# Patient Record
Sex: MALE | Race: BLACK OR AFRICAN AMERICAN | NOT HISPANIC OR LATINO | Employment: UNEMPLOYED | URBAN - METROPOLITAN AREA
[De-identification: names, ages, dates, MRNs, and addresses within clinical notes are randomized per-mention and may not be internally consistent; named-entity substitution may affect disease eponyms.]

---

## 2017-01-06 ENCOUNTER — OFFICE VISIT (OUTPATIENT)
Dept: OPHTHALMOLOGY | Facility: CLINIC | Age: 37
End: 2017-01-06

## 2017-01-06 DIAGNOSIS — S04.012A: Primary | ICD-10-CM

## 2017-01-06 DIAGNOSIS — H26.102 UNSPECIFIED TRAUMATIC CATARACT, LEFT EYE: ICD-10-CM

## 2017-01-06 DIAGNOSIS — H43.89 VITRITIS OF LEFT EYE: ICD-10-CM

## 2017-01-06 DIAGNOSIS — H54.40 VISUAL LOSS, ONE EYE, NO LIGHT PERCEPTION (NLP): ICD-10-CM

## 2017-01-06 ASSESSMENT — CONF VISUAL FIELD: OS_NORMAL: 1

## 2017-01-06 ASSESSMENT — CUP TO DISC RATIO: OD_RATIO: 0.4

## 2017-01-06 ASSESSMENT — SLIT LAMP EXAM - LIDS: COMMENTS: NORMAL

## 2017-01-06 ASSESSMENT — VISUAL ACUITY
METHOD: SNELLEN - LINEAR
OD_SC: 20/20
OD_SC+: -1
OS_SC: NLP

## 2017-01-06 ASSESSMENT — TONOMETRY
IOP_METHOD: APPLANATION
OS_IOP_MMHG: 08
OD_IOP_MMHG: 14

## 2017-01-06 ASSESSMENT — EXTERNAL EXAM - RIGHT EYE: OD_EXAM: NORMAL
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
## 2017-01-06 NOTE — MR AVS SNAPSHOT
After Visit Summary   2017    Kianna Lux    MRN: [id number]           Patient Information     Date Of Birth          1980        Visit Information        Provider Department      2017 2:20 PM Eryn Padilla MD [facility]         Follow-ups after your visit        Your next 10 appointments already scheduled     2017  2:45 PM   NEW RETINA with Nelida Barrientos MD   Eye Clinic ([facility])    [address]  [address]  [address]  [facility] [address] [phone]              Who to contact     Please call your clinic at [phone] to:    Ask questions about your health    Make or cancel appointments    Discuss your medicines    Learn about your test results    Speak to your doctor   If you have compliments or concerns about an experience at your clinic, or if you wish to file a complaint, please contact [facility] Patient Relations at [phone] or email us at [email or web address]         Additional Information About Your Visit        MyChart Information     Storific is an electronic gateway that provides easy, online access to your medical records. With Storific, you can request a clinic appointment, read your test results, renew a prescription or communicate with your care team.     To sign up for Storific visit the website at [email or web address]   You will be asked to enter the access code listed below, as well as some personal information. Please follow the directions to create your username and password.     Your access code is: [email or web address]  Expires: 2017  3:44 PM     Your access code will  in 90 days. If you need help or a new code, please contact your [facility] or call [phone] for assistance.        Care EveryWhere ID     This is your Care EveryWhere ID. This could be used by other  organizations to access your [place] medical records  [id number]         Blood Pressure from Last 3 Encounters:   No data found for BP    Weight from Last 3 Encounters:   No data found for Wt              Today, you had the following     No orders found for display       Primary Care Provider    None Specified       No primary provider on file.        Thank you!     Thank you for choosing [facility]  for your care. Our goal is always to provide you with excellent care. Hearing back from our patients is one way we can continue to improve our services. Please take a few minutes to complete the written survey that you may receive in the mail after your visit with us. Thank you!             Your Updated Medication List - Protect others around you: Learn how to safely use, store and throw away your medicines at www.disposemymeds.org.      Notice  As of 1/6/2017  3:44 PM    You have not been prescribed any medications.

## 2017-01-09 ENCOUNTER — OFFICE VISIT (OUTPATIENT)
Dept: OPHTHALMOLOGY | Facility: CLINIC | Age: 37
End: 2017-01-09
Attending: OPHTHALMOLOGY
Payer: MEDICAID

## 2017-01-09 DIAGNOSIS — S05.92XA: Primary | ICD-10-CM

## 2017-01-09 DIAGNOSIS — S04.012A: ICD-10-CM

## 2017-01-09 PROCEDURE — 99213 OFFICE O/P EST LOW 20 MIN: CPT | Mod: ZF

## 2017-01-09 RX ORDER — LATANOPROST 50 UG/ML
1 SOLUTION/ DROPS OPHTHALMIC AT BEDTIME
COMMUNITY
Start: 2016-12-15 | End: 2017-03-27

## 2017-01-09 RX ORDER — PREDNISOLONE ACETATE 10 MG/ML
1 SUSPENSION/ DROPS OPHTHALMIC 4 TIMES DAILY
Qty: 2 ML | Refills: 0 | Status: SHIPPED | OUTPATIENT
Start: 2017-01-09 | End: 2017-01-16

## 2017-01-09 ASSESSMENT — REFRACTION_WEARINGRX
OS_SPHERE: PLANO
OD_SPHERE: PLANO

## 2017-01-09 ASSESSMENT — TONOMETRY
OD_IOP_MMHG: 16
IOP_METHOD: TONOPEN
OS_IOP_MMHG: 10

## 2017-01-09 ASSESSMENT — CONF VISUAL FIELD
OS_SUPERIOR_NASAL_RESTRICTION: 1
OD_NORMAL: 1
OS_INFERIOR_NASAL_RESTRICTION: 1
OS_INFERIOR_TEMPORAL_RESTRICTION: 1
OS_SUPERIOR_TEMPORAL_RESTRICTION: 1

## 2017-01-09 ASSESSMENT — VISUAL ACUITY
OD_SC+: -1
OS_SC: LP
METHOD: SNELLEN - LINEAR
OD_SC: 20/15

## 2017-01-09 ASSESSMENT — EXTERNAL EXAM - RIGHT EYE: OD_EXAM: NORMAL

## 2017-01-09 ASSESSMENT — SLIT LAMP EXAM - LIDS: COMMENTS: NORMAL

## 2017-01-09 ASSESSMENT — CUP TO DISC RATIO: OD_RATIO: 0.4

## 2017-01-09 NOTE — NURSING NOTE
Chief Complaints and History of Present Illnesses   Patient presents with     Follow Up For     3 day follow up Optic nerve trauma of left eye     HPI    Affected eye(s):  Left   Symptoms:     No floaters   No flashes   No redness   No Dryness         Do you have eye pain now?:  Yes   Location:  OS   Pain Frequency:  Constant   Pain Characteristics:  Stabbing   Other:  pinching.      Comments:  Pt states vision has not changed since last visit. Pt feeling pinching eye pain in the back of LE today.    Mingo HANNON January 9, 2017 3:23 PM
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
## 2017-01-09 NOTE — MR AVS SNAPSHOT
After Visit Summary   2017    Kianna Lux    MRN: [id number]           Patient Information     Date Of Birth          1980        Visit Information        Provider Department      2017 2:45 PM Nelida Barrientos MD Eye Clinic        Today's Diagnoses     Ocular trauma, left eye, initial encounter    -  1        Follow-ups after your visit        Your next 10 appointments already scheduled     2017  3:30 PM   RETURN RETINA with Nelida Barrientos MD   Eye Clinic ([email or web address])    [address]  [address] [address]  9th [address] Clin [address]a  [facility] [address]   [phone]              Who to contact     Please call your clinic at [phone] to:    Ask questions about your health    Make or cancel appointments    Discuss your medicines    Learn about your test results    Speak to your doctor   If you have compliments or concerns about an experience at your clinic, or if you wish to file a complaint, please contact [facility] Patient Relations at [phone] or email us at [email or web address]         Additional Information About Your Visit        MyChart Information     sigmacaret is an electronic gateway that provides easy, online access to your medical records. With Lentigen, you can request a clinic appointment, read your test results, renew a prescription or communicate with your care team.     To sign up for Lentigen visit the website at [email or web address]   You will be asked to enter the access code listed below, as well as some personal information. Please follow the directions to create your username and password.     Your access code is: [email or web address]  Expires: 2017  3:44 PM     Your access code will  in 90 days. If you need help or a new code, please contact your [facility] or call [phone] for assistance.        Care EveryWhere ID     This is your  Care EveryWhere ID. This could be used by other organizations to access your [place] medical records  [id number]         Blood Pressure from Last 3 Encounters:   No data found for BP    Weight from Last 3 Encounters:   No data found for Wt              Today, you had the following     No orders found for display         Today's Medication Changes          These changes are accurate as of: 1/9/17  4:47 PM.  If you have any questions, ask your nurse or doctor.               Start taking these medicines.        Dose/Directions    prednisoLONE acetate 1 % ophthalmic susp   Commonly known as:  PRED FORTE   Used for:  Ocular trauma, left eye, initial encounter   Started by:  Nelida Barrientos MD        Dose:  1 drop   Place 1 drop Into the left eye 4 times daily for 7 days   Quantity:  2 mL   Refills:  0            Where to get your medicines      These medications were sent to [facility]/pharmacy #[id number] - [place], [place] - [address], [place] [address]     Phone:  [phone]    - prednisoLONE acetate 1 % ophthalmic susp             Primary Care Provider    None Specified       No primary provider on file.        Thank you!     Thank you for choosing EYE CLINIC  for your care. Our goal is always to provide you with excellent care. Hearing back from our patients is one way we can continue to improve our services. Please take a few minutes to complete the written survey that you may receive in the mail after your visit with us. Thank you!             Your Updated Medication List - Protect others around you: Learn how to safely use, store and throw away your medicines at www.disposemymeds.org.          This list is accurate as of: 1/9/17  4:47 PM.  Always use your most recent med list.                   Brand Name Dispense Instructions for use    latanoprost 0.005 % ophthalmic solution    XALATAN     Place 1 drop Into the left eye At Bedtime       prednisoLONE acetate 1 %  ophthalmic susp    PRED FORTE    2 mL    Place 1 drop Into the left eye 4 times daily for 7 days

## 2017-01-16 ENCOUNTER — OFFICE VISIT (OUTPATIENT)
Dept: OPHTHALMOLOGY | Facility: CLINIC | Age: 37
End: 2017-01-16
Attending: OPHTHALMOLOGY
Payer: MEDICAID

## 2017-01-16 DIAGNOSIS — S05.92XD OCULAR TRAUMA, LEFT EYE, SUBSEQUENT ENCOUNTER: Primary | ICD-10-CM

## 2017-01-16 PROCEDURE — 92133 CPTRZD OPH DX IMG PST SGM ON: CPT | Mod: ZF | Performed by: OPHTHALMOLOGY

## 2017-01-16 PROCEDURE — 99213 OFFICE O/P EST LOW 20 MIN: CPT | Mod: ZF

## 2017-01-16 PROCEDURE — 92134 CPTRZ OPH DX IMG PST SGM RTA: CPT | Mod: ZF | Performed by: OPHTHALMOLOGY

## 2017-01-16 ASSESSMENT — SLIT LAMP EXAM - LIDS: COMMENTS: NORMAL

## 2017-01-16 ASSESSMENT — VISUAL ACUITY
METHOD: SNELLEN - LINEAR
OD_SC: 20/15
OD_SC+: -3

## 2017-01-16 ASSESSMENT — TONOMETRY
OD_IOP_MMHG: 13
OS_IOP_MMHG: 12
IOP_METHOD: TONOPEN

## 2017-01-16 ASSESSMENT — CUP TO DISC RATIO: OD_RATIO: 0.4

## 2017-01-16 ASSESSMENT — EXTERNAL EXAM - RIGHT EYE: OD_EXAM: NORMAL
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
## 2017-01-16 NOTE — MR AVS SNAPSHOT
After Visit Summary   2017    Kianna Lux    MRN: [id number]           Patient Information     Date Of Birth          1980        Visit Information        Provider Department      2017 3:30 PM Nelida Barrientos MD Eye Clinic        Today's Diagnoses     Ocular trauma, left eye, subsequent encounter    -  1        Follow-ups after your visit        Your next 10 appointments already scheduled     2017  3:30 PM   RETURN RETINA with Nelida Barrientos MD   Eye Clinic ([facility])    [address]  [address] [facility] [address] [facility]  9th [address] Clin [address]a  [facility] [address]   [phone]              Who to contact     Please call your clinic at [phone] to:    Ask questions about your health    Make or cancel appointments    Discuss your medicines    Learn about your test results    Speak to your doctor   If you have compliments or concerns about an experience at your clinic, or if you wish to file a complaint, please contact [facility] Patient Relations at [phone] or email us at [email or web address]         Additional Information About Your Visit        MyChart Information     Keystokt is an electronic gateway that provides easy, online access to your medical records. With Pavegen Systems, you can request a clinic appointment, read your test results, renew a prescription or communicate with your care team.     To sign up for Pavegen Systems visit the website at [email or web address]   You will be asked to enter the access code listed below, as well as some personal information. Please follow the directions to create your username and password.     Your access code is: [email or web address]  Expires: 2017  3:44 PM     Your access code will  in 90 days. If you need help or a new code, please contact your [facility] or call [phone] for assistance.        Care EveryWhere ID     This is  your Care EveryWhere ID. This could be used by other organizations to access your [place] medical records  [id number]         Blood Pressure from Last 3 Encounters:   No data found for BP    Weight from Last 3 Encounters:   No data found for Wt              We Performed the Following     OCT Optic Nerve RNFL Spectralis OU (both eyes)     OCT Retina Spectralis OU (both eyes)        Primary Care Provider    None Specified       No primary provider on file.        Thank you!     Thank you for choosing EYE CLINIC  for your care. Our goal is always to provide you with excellent care. Hearing back from our patients is one way we can continue to improve our services. Please take a few minutes to complete the written survey that you may receive in the mail after your visit with us. Thank you!             Your Updated Medication List - Protect others around you: Learn how to safely use, store and throw away your medicines at www.disposemymeds.org.          This list is accurate as of: 1/16/17  5:18 PM.  Always use your most recent med list.                   Brand Name Dispense Instructions for use    latanoprost 0.005 % ophthalmic solution    XALATAN     Place 1 drop Into the left eye At Bedtime       prednisoLONE acetate 1 % ophthalmic susp    PRED FORTE    2 mL    Place 1 drop Into the left eye 4 times daily for 7 days

## 2017-01-30 ENCOUNTER — OFFICE VISIT (OUTPATIENT)
Dept: OPHTHALMOLOGY | Facility: CLINIC | Age: 37
End: 2017-01-30
Attending: OPHTHALMOLOGY
Payer: MEDICAID

## 2017-01-30 DIAGNOSIS — H47.20 OPTIC NERVE ATROPHY, LEFT: ICD-10-CM

## 2017-01-30 DIAGNOSIS — S05.92XD OCULAR TRAUMA, LEFT EYE, SUBSEQUENT ENCOUNTER: Primary | ICD-10-CM

## 2017-01-30 DIAGNOSIS — H31.322 CHOROIDAL RUPTURE, LEFT EYE: ICD-10-CM

## 2017-01-30 PROCEDURE — 92250 FUNDUS PHOTOGRAPHY W/I&R: CPT | Mod: 59 | Performed by: OPHTHALMOLOGY

## 2017-01-30 PROCEDURE — 99212 OFFICE O/P EST SF 10 MIN: CPT | Mod: 25,ZF

## 2017-01-30 PROCEDURE — 92134 CPTRZ OPH DX IMG PST SGM RTA: CPT | Mod: ZF | Performed by: OPHTHALMOLOGY

## 2017-01-30 RX ORDER — ATROPINE SULFATE 10 MG/ML
1 SOLUTION/ DROPS OPHTHALMIC DAILY
Qty: 1 BOTTLE | Refills: 1 | Status: SHIPPED | OUTPATIENT
Start: 2017-01-30 | End: 2017-03-21

## 2017-01-30 ASSESSMENT — TONOMETRY
IOP_METHOD: TONOPEN
OD_IOP_MMHG: 15
OS_IOP_MMHG: 21

## 2017-01-30 ASSESSMENT — SLIT LAMP EXAM - LIDS: COMMENTS: NORMAL

## 2017-01-30 ASSESSMENT — CUP TO DISC RATIO: OD_RATIO: 0.4

## 2017-01-30 ASSESSMENT — VISUAL ACUITY
OS_SC: NLP
METHOD: SNELLEN - LINEAR
OD_SC: 20/15

## 2017-01-30 ASSESSMENT — CONF VISUAL FIELD
OS_INFERIOR_TEMPORAL_RESTRICTION: 1
OS_INFERIOR_NASAL_RESTRICTION: 1
OS_SUPERIOR_NASAL_RESTRICTION: 1
OS_SUPERIOR_TEMPORAL_RESTRICTION: 1

## 2017-01-30 ASSESSMENT — EXTERNAL EXAM - RIGHT EYE: OD_EXAM: NORMAL

## 2017-01-30 NOTE — NURSING NOTE
Chief Complaints and History of Present Illnesses   Patient presents with     Follow Up For     2 week follow up LE     HPI    Affected eye(s):  Left   Symptoms:           Do you have eye pain now?:  Yes   Location:  OS   Pain Level:  Mild Pain (3)   Pain Duration:  2 days   Pain Frequency:  Intermittent   Pain Characteristics:  Stabbing      Comments:  Latanoprost took last PM for first time  Pred forte qid KAREN  No change in vision since last visit  Tiffany RG 3:38 PM January 30, 2017

## 2017-01-30 NOTE — MR AVS SNAPSHOT
After Visit Summary   2017    Kianna Lux    MRN: [id number]           Patient Information     Date Of Birth          1980        Visit Information        Provider Department      2017 3:30 PM Nelida Barrientos MD Eye Clinic        Today's Diagnoses     Ocular trauma, left eye, subsequent encounter    -  1     Optic nerve atrophy, left         Choroidal rupture, left eye            Follow-ups after your visit        Your next 10 appointments already scheduled     Mar 06, 2017  3:30 PM   RETURN RETINA with Nelida Barrientos MD   Eye Clinic ([facility])    [address]  [address]  9th [address] Clin [address]a  [facility] [address]   [phone]              Who to contact     Please call your clinic at [phone] to:    Ask questions about your health    Make or cancel appointments    Discuss your medicines    Learn about your test results    Speak to your doctor   If you have compliments or concerns about an experience at your clinic, or if you wish to file a complaint, please contact [facility] Patient Relations at [phone] or email us at [email or web address]         Additional Information About Your Visit        MyChart Information     Elastix Corporationt is an electronic gateway that provides easy, online access to your medical records. With Plugaround, you can request a clinic appointment, read your test results, renew a prescription or communicate with your care team.     To sign up for Elastix Corporationt visit the website at [email or web address]   You will be asked to enter the access code listed below, as well as some personal information. Please follow the directions to create your username and password.     Your access code is: [email or web address]  Expires: 2017  3:44 PM     Your access code will  in 90 days. If you need help or a new code, please contact your [facility] or call  [phone] for assistance.        Care EveryWhere ID     This is your Care EveryWhere ID. This could be used by other organizations to access your [place] medical records  [id number]         Blood Pressure from Last 3 Encounters:   No data found for BP    Weight from Last 3 Encounters:   No data found for Wt              We Performed the Following     Fundus Photos OU (both eyes)     OCT Retina Spectralis OU (both eyes)        Primary Care Provider    None Specified       No primary provider on file.        Thank you!     Thank you for choosing EYE CLINIC  for your care. Our goal is always to provide you with excellent care. Hearing back from our patients is one way we can continue to improve our services. Please take a few minutes to complete the written survey that you may receive in the mail after your visit with us. Thank you!             Your Updated Medication List - Protect others around you: Learn how to safely use, store and throw away your medicines at www.disposemymeds.org.          This list is accurate as of: 1/30/17  5:07 PM.  Always use your most recent med list.                   Brand Name Dispense Instructions for use    latanoprost 0.005 % ophthalmic solution    XALATAN     Place 1 drop Into the left eye At Bedtime
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
## 2017-03-15 ENCOUNTER — TELEPHONE (OUTPATIENT)
Dept: OPHTHALMOLOGY | Facility: CLINIC | Age: 37
End: 2017-03-15

## 2017-03-15 NOTE — TELEPHONE ENCOUNTER
Reviewed with pharmacy prednisolone eye drop.  Therapy compleate after starting taper 1-30-17  No refill necessary  Bharathi Alberto RN 9:10 AM 03/15/17

## 2017-03-15 NOTE — TELEPHONE ENCOUNTER
Pharmacy calling  Atropine on hold at pharmacy  Would like to know if should fill per pt request  Was unable to locate if should continue per last note  Note given to retina resident to verify if should continue  Bharathi Alberto RN 4:29 PM 03/15/17

## 2017-03-15 NOTE — TELEPHONE ENCOUNTER
Dr. Lance reviewed  Ok to dispense atropine with no refills until reassessed end of month-- atropine currently hold  Bharathi Alberto RN 4:52 PM 03/15/17    Pharmacy aware

## 2017-03-17 DIAGNOSIS — S05.92XD OCULAR TRAUMA, LEFT EYE, SUBSEQUENT ENCOUNTER: ICD-10-CM
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
## 2017-03-17 NOTE — TELEPHONE ENCOUNTER
----- Message from Eryn Christy sent at 3/17/2017  1:38 PM CDT -----  Regarding: Pt Needs Rx ASAP - Pt is out of drops  Contact: [phone]  Pt called today requesting a refill of : atropine 1 % ophthalmic solution.    Has the Pt called the Pharmacy to request? : Yes. They do not have any refills on file. (called old Pharmacy).    The specific requested Pharmacy? : NEW Pharmacy!!! [facility], [address] [place] [phone], Ph # : [phone].    How soon is the refill needed? : ASAP - Pt is out.    Is there any special information the nurse/provider would need to be sure that this is taken care of correctly and quickly? : Pt in the [facility].    Is there a particular number that should be used if there are questions when refilling this medication? : [phone].      Thanks,  Eryn in Call Center    Please DO NOT send this message and/or reply back to sender.  Call Center Representatives DO NOT respond to messages.

## 2017-03-20 RX ORDER — ATROPINE SULFATE 10 MG/ML
1 SOLUTION/ DROPS OPHTHALMIC DAILY
Qty: 1 BOTTLE | Refills: 1 | OUTPATIENT
Start: 2017-03-20

## 2017-03-21 DIAGNOSIS — S05.92XD OCULAR TRAUMA, LEFT EYE, SUBSEQUENT ENCOUNTER: ICD-10-CM

## 2017-03-21 RX ORDER — ATROPINE SULFATE 10 MG/ML
1 SOLUTION/ DROPS OPHTHALMIC DAILY
Qty: 1 BOTTLE | Refills: 0 | Status: SHIPPED | OUTPATIENT
Start: 2017-03-21 | End: 2017-03-27

## 2017-03-21 NOTE — TELEPHONE ENCOUNTER
Ok per Dr. Lance (retina resident) to dispense one bottle until next visit  Bharathi Alberto RN 10:25 AM 03/21/17

## 2017-03-27 ENCOUNTER — OFFICE VISIT (OUTPATIENT)
Dept: OPHTHALMOLOGY | Facility: CLINIC | Age: 37
End: 2017-03-27
Attending: OPHTHALMOLOGY
Payer: COMMERCIAL

## 2017-03-27 DIAGNOSIS — H46.8 TRAUMATIC OPTIC NEUROPATHY: Primary | ICD-10-CM

## 2017-03-27 DIAGNOSIS — H31.322 CHOROIDAL RUPTURE, LEFT: ICD-10-CM

## 2017-03-27 PROCEDURE — 99213 OFFICE O/P EST LOW 20 MIN: CPT | Mod: ZF

## 2017-03-27 ASSESSMENT — TONOMETRY
OD_IOP_MMHG: 13
IOP_METHOD: TONOPEN
OS_IOP_MMHG: 13

## 2017-03-27 ASSESSMENT — CUP TO DISC RATIO: OD_RATIO: 0.4

## 2017-03-27 ASSESSMENT — SLIT LAMP EXAM - LIDS: COMMENTS: NORMAL

## 2017-03-27 ASSESSMENT — VISUAL ACUITY
OD_SC: 20/15
OS_SC: NLP
METHOD: SNELLEN - LINEAR

## 2017-03-27 ASSESSMENT — EXTERNAL EXAM - RIGHT EYE: OD_EXAM: NORMAL

## 2017-03-27 NOTE — MR AVS SNAPSHOT
After Visit Summary   3/27/2017    Kianna Lux    MRN: [id number]           Patient Information     Date Of Birth          1980        Visit Information        Provider Department      3/27/2017 3:00 PM [facility]        Today's Diagnoses     Traumatic optic neuropathy    -  1    Choroidal rupture, left           Follow-ups after your visit        Follow-up notes from your care team     Return in about 3 months (around 2017) for left eye trauma.      Who to contact     Please call your clinic at [phone] to:    Ask questions about your health    Make or cancel appointments    Discuss your medicines    Learn about your test results    Speak to your doctor   If you have compliments or concerns about an experience at your clinic, or if you wish to file a complaint, please contact [facility] Patient Relations at [phone] or email us at [email or web address]         Additional Information About Your Visit        MyChart Information     [employer] is an electronic gateway that provides easy, online access to your medical records. With EBOOKAPLACE, you can request a clinic appointment, read your test results, renew a prescription or communicate with your care team.     To sign up for [employer] visit the website at [email or web address]   You will be asked to enter the access code listed below, as well as some personal information. Please follow the directions to create your username and password.     Your access code is: [email or web address]  Expires: 2017  4:44 PM     Your access code will  in 90 days. If you need help or a new code, please contact your [facility] or call [phone] for assistance.        Care EveryWhere ID     This is your Care EveryWhere ID. This could be used by other organizations to access your [facility] medical records  [id number]         Blood Pressure from Last  3 Encounters:   No data found for BP    Weight from Last 3 Encounters:   No data found for Wt                 Today's Medication Changes          These changes are accurate as of: 3/27/17 11:59 PM.  If you have any questions, ask your nurse or doctor.               Stop taking these medicines if you haven't already. Please contact your care team if you have questions.     atropine 1 % ophthalmic solution   Stopped by:  Nelida Barrientos MD           latanoprost 0.005 % ophthalmic solution   Commonly known as:  XALATAN   Stopped by:  Nelida Barrientos MD                    Primary Care Provider    None Specified       No primary provider on file.        Thank you!     Thank you for choosing [facility]IC  for your care. Our goal is always to provide you with excellent care. Hearing back from our patients is one way we can continue to improve our services. Please take a few minutes to complete the written survey that you may receive in the mail after your visit with us. Thank you!             Your Updated Medication List - Protect others around you: Learn how to safely use, store and throw away your medicines at www.disposemymeds.org.      Notice  As of 3/27/2017 11:59 PM    You have not been prescribed any medications.
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
## 2017-08-29 ENCOUNTER — OFFICE VISIT (OUTPATIENT)
Dept: OPHTHALMOLOGY | Facility: CLINIC | Age: 37
End: 2017-08-29

## 2017-08-29 DIAGNOSIS — H57.12 PAIN IN OR AROUND EYE, LEFT: Primary | ICD-10-CM

## 2017-08-29 DIAGNOSIS — H47.20 OPTIC NERVE ATROPHY, LEFT: ICD-10-CM

## 2017-08-29 DIAGNOSIS — S05.92XD OCULAR TRAUMA, LEFT EYE, SUBSEQUENT ENCOUNTER: ICD-10-CM

## 2017-08-29 DIAGNOSIS — H46.8 TRAUMATIC OPTIC NEUROPATHY: ICD-10-CM

## 2017-08-29 RX ORDER — ATROPINE SULFATE 10 MG/ML
1 SOLUTION/ DROPS OPHTHALMIC DAILY
Qty: 1 BOTTLE | Refills: 0 | Status: SHIPPED | OUTPATIENT
Start: 2017-08-29 | End: 2017-11-22

## 2017-08-29 RX ORDER — ACETAMINOPHEN 325 MG/1
650 TABLET ORAL EVERY 4 HOURS PRN
COMMUNITY
Start: 2016-11-28 | End: 2018-06-23

## 2017-08-29 ASSESSMENT — REFRACTION_WEARINGRX
OD_SPHERE: PLANO
OS_SPHERE: PLANO

## 2017-08-29 ASSESSMENT — CONF VISUAL FIELD
OS_INFERIOR_TEMPORAL_RESTRICTION: 1
OS_SUPERIOR_NASAL_RESTRICTION: 1
METHOD: COUNTING FINGERS
OS_SUPERIOR_TEMPORAL_RESTRICTION: 1
OS_INFERIOR_NASAL_RESTRICTION: 1

## 2017-08-29 ASSESSMENT — CUP TO DISC RATIO: OD_RATIO: 0.4

## 2017-08-29 ASSESSMENT — VISUAL ACUITY
METHOD: SNELLEN - LINEAR
OS_SC: NLP
OD_SC: 20/15

## 2017-08-29 ASSESSMENT — EXTERNAL EXAM - RIGHT EYE: OD_EXAM: NORMAL

## 2017-08-29 ASSESSMENT — TONOMETRY
IOP_METHOD: ICARE
OS_IOP_MMHG: 20
OD_IOP_MMHG: 21

## 2017-08-29 ASSESSMENT — SLIT LAMP EXAM - LIDS: COMMENTS: NORMAL

## 2017-08-29 NOTE — MR AVS SNAPSHOT
After Visit Summary   2017    Kianna Lux    MRN: [id number]           Patient Information     Date Of Birth          1980        Visit Information        Provider Department      2017 1:20 PM Eryn Padilla MD [facility]        Today's Diagnoses     Pain in or around eye, left    -  1       Follow-ups after your visit        Who to contact     Please call your clinic at [phone] to:    Ask questions about your health    Make or cancel appointments    Discuss your medicines    Learn about your test results    Speak to your doctor   If you have compliments or concerns about an experience at your clinic, or if you wish to file a complaint, please contact [facility] Patient Relations at [phone] or email us at [email or web address]         Additional Information About Your Visit        MyChart Information     PlanetHSt is an electronic gateway that provides easy, online access to your medical records. With Yo que Vos, you can request a clinic appointment, read your test results, renew a prescription or communicate with your care team.     To sign up for PlanetHSt visit the website at [email or web address]   You will be asked to enter the access code listed below, as well as some personal information. Please follow the directions to create your username and password.     Your access code is: [email or web address]  Expires: 2017  6:30 AM     Your access code will  in 90 days. If you need help or a new code, please contact your [facility] or call [phone] for assistance.        Care EveryWhere ID     This is your Care EveryWhere ID. This could be used by other organizations to access your [facility] medical records  [id number]         Blood Pressure from Last 3 Encounters:   No data found for BP    Weight from Last 3 Encounters:   No data found for Wt               Today, you had the following     No orders found for display         Today's Medication Changes          These changes are accurate as of: 8/29/17  2:09 PM.  If you have any questions, ask your nurse or doctor.               Start taking these medicines.        Dose/Directions    atropine 1 % ophthalmic solution   Used for:  Pain in or around eye, left   Started by:  Eryn Padilla MD        Dose:  1 drop   Place 1 drop Into the left eye daily   Quantity:  1 Bottle   Refills:  0            Where to get your medicines      These medications were sent to [facility]/pharmacy #[id number] - [place], [place] - [address] AT [facility]  [address] [place] [address]    Hours:  24-hours Phone:  [phone]     atropine 1 % ophthalmic solution                Primary Care Provider    None Specified       No primary provider on file.        Equal Access to Services     PADILLA CHAUDHARI : Tao carroll Solazara, waaxda luqadaha, qaybta kaalmada toryyaaiyana, sandra lentz . So [facility] [phone].    ATENCIÓN: Si habla español, tiene a robins disposición servicios gratuitos de asistencia lingüística. Llame al [phone].    We comply with applicable federal civil rights laws and [place] laws. We do not discriminate on the basis of race, color, national origin, age, disability sex, sexual orientation or gender identity.            Thank you!     Thank you for choosing [facility]  for your care. Our goal is always to provide you with excellent care. Hearing back from our patients is one way we can continue to improve our services. Please take a few minutes to complete the written survey that you may receive in the mail after your visit with us. Thank you!             Your Updated Medication List - Protect others around you: Learn how to safely use, store and throw away your medicines at www.disposemymeds.org.          This list is accurate as  of: 8/29/17  2:09 PM.  Always use your most recent med list.                   Brand Name Dispense Instructions for use Diagnosis    acetaminophen 325 MG tablet    TYLENOL     Take 650 mg by mouth every 4 hours as needed        atropine 1 % ophthalmic solution     1 Bottle    Place 1 drop Into the left eye daily    Pain in or around eye, left

## 2017-09-04 ASSESSMENT — EXTERNAL EXAM - LEFT EYE: OS_EXAM: NORMAL

## 2017-09-04 ASSESSMENT — CONF VISUAL FIELD: OD_NORMAL: 1
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
## 2017-10-20 ENCOUNTER — TELEPHONE (OUTPATIENT)
Dept: OPHTHALMOLOGY | Facility: CLINIC | Age: 37
End: 2017-10-20

## 2017-10-20 NOTE — TELEPHONE ENCOUNTER
----- Message from Eryn Padilla MD sent at 10/20/2017 12:02 PM CDT -----  Can you print the following information and fax to [facility]?  Patient requested a referral.    [phone] is the fax for   [facility]        Fwd:  Thank you for your referral.       The Department of Ophthalmology is requesting medical information for review.    Please fax or mail the following medical records to [phone] or to the address below:              A letter summarizing the patient's medical condition, current symptoms and reason for referral              Copies of medical record information, including- Pertinent clinical and surgical documentation              Current list of all medication (prescription and over-the-counter medication, herbal and dietary supplements, and vitamins)              A complete record of immunizations to date              Pathology reports              Recent testing and results (X-rays, CT, MRI reports)              Prior operative reports       Actual imaging/clinical information can be mailed to the following address  (if unable to push through to our system or already available):       Department of Ophthalmology  [address] [address]    Include the patient's full name, date of birth and [facility] number on all correspondence.    If you have questions, please call us at [phone] or [phone]. You may also reply to this message.          Sincerely,  [facility] for Referring Physicians Appointment Office

## 2017-11-22 DIAGNOSIS — H57.12 PAIN IN OR AROUND EYE, LEFT: Primary | ICD-10-CM

## 2017-11-22 RX ORDER — ATROPINE SULFATE 10 MG/ML
1 SOLUTION/ DROPS OPHTHALMIC DAILY
Qty: 15 ML | Refills: 0 | Status: SHIPPED | OUTPATIENT
Start: 2017-11-22 | End: 2018-06-23

## 2017-12-04 DIAGNOSIS — Z53.9 ERRONEOUS ENCOUNTER--DISREGARD: ICD-10-CM

## 2017-12-04 DIAGNOSIS — S05.90XA OCULAR TRAUMA: Primary | ICD-10-CM

## 2017-12-05 NOTE — TELEPHONE ENCOUNTER
"----- Message from Chin Castrejon sent at 12/4/2017  2:26 PM CST -----  Regarding: Refresh optic gel drops refill  Contact: [phone]  Patient calls today requesting a refill of: Refresh optic gel drops.    Has the patient called the pharmacy to request? Refill is not on chart, needs auth. (If not suggest that they can do that in the future and it may speed up the process)    The specific requested pharmacy (name and address) is [facility] [address] [place] [address] . (Cannot be \"it is in the chart\" -- name and location needed.)    How soon is the refill needed? Within a few days, nothing urgent.    Is there any special information the nurse/provider would needed to be sure that this is taken care of correctly and quickly? Didn't see the prescription on his chart, even checked non-current prescr. Claimed he took it awhile back, now would like a refill as the Atropine he is currently taking doesn't work as well.    Is there a particular number that should be used if there are questions when refilling this medication? Pt's number: [phone], pharmacy phone #: [phone]. Fax is listed as same number as phone.    Thank you,    Chin Castrejon  Call Center    Please DO NOT send this message and/or reply back to sender. Call Center Representatives DO NOT respond to messages.  "
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
## 2017-12-13 DIAGNOSIS — H04.123 DRY EYES, BILATERAL: Primary | ICD-10-CM
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
## 2018-06-23 ENCOUNTER — HOSPITAL ENCOUNTER (EMERGENCY)
Facility: CLINIC | Age: 38
Discharge: HOME | End: 2018-06-23
Attending: EMERGENCY MEDICINE | Admitting: EMERGENCY MEDICINE
Payer: COMMERCIAL

## 2018-06-23 ENCOUNTER — APPOINTMENT (OUTPATIENT)
Dept: GENERAL RADIOLOGY | Facility: CLINIC | Age: 38
End: 2018-06-23
Attending: EMERGENCY MEDICINE
Payer: COMMERCIAL

## 2018-06-23 ENCOUNTER — APPOINTMENT (OUTPATIENT)
Dept: CT IMAGING | Facility: CLINIC | Age: 38
End: 2018-06-23
Attending: EMERGENCY MEDICINE
Payer: COMMERCIAL

## 2018-06-23 VITALS
BODY MASS INDEX: 22.4 KG/M2 | HEIGHT: 71 IN | HEART RATE: 84 BPM | WEIGHT: 160 LBS | RESPIRATION RATE: 16 BRPM | SYSTOLIC BLOOD PRESSURE: 154 MMHG | OXYGEN SATURATION: 98 % | TEMPERATURE: 98.2 F | DIASTOLIC BLOOD PRESSURE: 100 MMHG

## 2018-06-23 DIAGNOSIS — N20.1 URETERAL STONE: ICD-10-CM

## 2018-06-23 DIAGNOSIS — N23 RENAL COLIC: ICD-10-CM

## 2018-06-23 LAB
ALBUMIN SERPL-MCNC: 4 G/DL (ref 3.4–5)
ALBUMIN UR-MCNC: NEGATIVE MG/DL
ALP SERPL-CCNC: 80 U/L (ref 40–150)
ALT SERPL W P-5'-P-CCNC: 21 U/L (ref 0–70)
AMORPH CRY #/AREA URNS HPF: ABNORMAL /HPF
AMPHETAMINES UR QL SCN: NEGATIVE
ANION GAP SERPL CALCULATED.3IONS-SCNC: 9 MMOL/L (ref 3–14)
APPEARANCE UR: ABNORMAL
AST SERPL W P-5'-P-CCNC: 20 U/L (ref 0–45)
BARBITURATES UR QL: NEGATIVE
BASOPHILS # BLD AUTO: 0 10E9/L (ref 0–0.2)
BASOPHILS NFR BLD AUTO: 0.1 %
BENZODIAZ UR QL: NEGATIVE
BILIRUB SERPL-MCNC: 0.5 MG/DL (ref 0.2–1.3)
BILIRUB UR QL STRIP: NEGATIVE
BUN SERPL-MCNC: 13 MG/DL (ref 7–30)
CALCIUM SERPL-MCNC: 8.9 MG/DL (ref 8.5–10.1)
CANNABINOIDS UR QL SCN: NEGATIVE
CHLORIDE SERPL-SCNC: 105 MMOL/L (ref 94–109)
CO2 SERPL-SCNC: 27 MMOL/L (ref 20–32)
COCAINE UR QL: NEGATIVE
COLOR UR AUTO: ABNORMAL
CREAT SERPL-MCNC: 0.96 MG/DL (ref 0.66–1.25)
DIFFERENTIAL METHOD BLD: ABNORMAL
EOSINOPHIL # BLD AUTO: 0 10E9/L (ref 0–0.7)
EOSINOPHIL NFR BLD AUTO: 0.2 %
ERYTHROCYTE [DISTWIDTH] IN BLOOD BY AUTOMATED COUNT: 12.6 % (ref 10–15)
ETHANOL UR QL SCN: NEGATIVE
GFR SERPL CREATININE-BSD FRML MDRD: 89 ML/MIN/1.7M2
GLUCOSE SERPL-MCNC: 137 MG/DL (ref 70–99)
GLUCOSE UR STRIP-MCNC: NEGATIVE MG/DL
HCT VFR BLD AUTO: 44.5 % (ref 40–53)
HGB BLD-MCNC: 15.2 G/DL (ref 13.3–17.7)
HGB UR QL STRIP: ABNORMAL
IMM GRANULOCYTES # BLD: 0.1 10E9/L (ref 0–0.4)
IMM GRANULOCYTES NFR BLD: 0.4 %
KETONES UR STRIP-MCNC: NEGATIVE MG/DL
LACTATE BLD-SCNC: 1.2 MMOL/L (ref 0.7–2)
LEUKOCYTE ESTERASE UR QL STRIP: NEGATIVE
LIPASE SERPL-CCNC: 158 U/L (ref 73–393)
LYMPHOCYTES # BLD AUTO: 0.8 10E9/L (ref 0.8–5.3)
LYMPHOCYTES NFR BLD AUTO: 7.4 %
MCH RBC QN AUTO: 27.9 PG (ref 26.5–33)
MCHC RBC AUTO-ENTMCNC: 34.2 G/DL (ref 31.5–36.5)
MCV RBC AUTO: 82 FL (ref 78–100)
MONOCYTES # BLD AUTO: 0.6 10E9/L (ref 0–1.3)
MONOCYTES NFR BLD AUTO: 5 %
NEUTROPHILS # BLD AUTO: 9.7 10E9/L (ref 1.6–8.3)
NEUTROPHILS NFR BLD AUTO: 86.9 %
NITRATE UR QL: NEGATIVE
NRBC # BLD AUTO: 0 10*3/UL
NRBC BLD AUTO-RTO: 0 /100
OPIATES UR QL SCN: NEGATIVE
PH UR STRIP: 7.5 PH (ref 5–7)
PLATELET # BLD AUTO: 282 10E9/L (ref 150–450)
POTASSIUM SERPL-SCNC: 3.5 MMOL/L (ref 3.4–5.3)
PROT SERPL-MCNC: 8.1 G/DL (ref 6.8–8.8)
RBC # BLD AUTO: 5.45 10E12/L (ref 4.4–5.9)
RBC #/AREA URNS AUTO: 90 /HPF (ref 0–2)
SODIUM SERPL-SCNC: 141 MMOL/L (ref 133–144)
SOURCE: ABNORMAL
SP GR UR STRIP: 1.01 (ref 1–1.03)
UROBILINOGEN UR STRIP-MCNC: NORMAL MG/DL (ref 0–2)
WBC # BLD AUTO: 11.2 10E9/L (ref 4–11)
WBC #/AREA URNS AUTO: 0 /HPF (ref 0–5)

## 2018-06-23 PROCEDURE — 96375 TX/PRO/DX INJ NEW DRUG ADDON: CPT | Performed by: EMERGENCY MEDICINE

## 2018-06-23 PROCEDURE — 99284 EMERGENCY DEPT VISIT MOD MDM: CPT | Mod: Z6 | Performed by: EMERGENCY MEDICINE

## 2018-06-23 PROCEDURE — 74176 CT ABD & PELVIS W/O CONTRAST: CPT

## 2018-06-23 PROCEDURE — 96361 HYDRATE IV INFUSION ADD-ON: CPT | Performed by: EMERGENCY MEDICINE

## 2018-06-23 PROCEDURE — 85025 COMPLETE CBC W/AUTO DIFF WBC: CPT | Performed by: EMERGENCY MEDICINE

## 2018-06-23 PROCEDURE — 83605 ASSAY OF LACTIC ACID: CPT | Performed by: EMERGENCY MEDICINE

## 2018-06-23 PROCEDURE — 80307 DRUG TEST PRSMV CHEM ANLYZR: CPT | Performed by: EMERGENCY MEDICINE

## 2018-06-23 PROCEDURE — 83690 ASSAY OF LIPASE: CPT | Performed by: EMERGENCY MEDICINE

## 2018-06-23 PROCEDURE — 81001 URINALYSIS AUTO W/SCOPE: CPT | Performed by: EMERGENCY MEDICINE

## 2018-06-23 PROCEDURE — 80320 DRUG SCREEN QUANTALCOHOLS: CPT | Performed by: EMERGENCY MEDICINE

## 2018-06-23 PROCEDURE — 96374 THER/PROPH/DIAG INJ IV PUSH: CPT | Performed by: EMERGENCY MEDICINE

## 2018-06-23 PROCEDURE — 80053 COMPREHEN METABOLIC PANEL: CPT | Performed by: EMERGENCY MEDICINE

## 2018-06-23 PROCEDURE — 99285 EMERGENCY DEPT VISIT HI MDM: CPT | Mod: 25 | Performed by: EMERGENCY MEDICINE

## 2018-06-23 PROCEDURE — 25000128 H RX IP 250 OP 636: Performed by: EMERGENCY MEDICINE

## 2018-06-23 PROCEDURE — 74019 RADEX ABDOMEN 2 VIEWS: CPT

## 2018-06-23 RX ORDER — IBUPROFEN 600 MG/1
600 TABLET, FILM COATED ORAL EVERY 6 HOURS PRN
Qty: 20 TABLET | Refills: 0 | Status: SHIPPED | OUTPATIENT
Start: 2018-06-23

## 2018-06-23 RX ORDER — TAMSULOSIN HYDROCHLORIDE 0.4 MG/1
0.4 CAPSULE ORAL DAILY
Qty: 10 CAPSULE | Refills: 0 | Status: SHIPPED | OUTPATIENT
Start: 2018-06-23 | End: 2018-07-03

## 2018-06-23 RX ORDER — HYDROCODONE BITARTRATE AND ACETAMINOPHEN 5; 325 MG/1; MG/1
1 TABLET ORAL EVERY 6 HOURS PRN
Qty: 10 TABLET | Refills: 0 | Status: ON HOLD | OUTPATIENT
Start: 2018-06-23 | End: 2019-12-13

## 2018-06-23 RX ORDER — KETOROLAC TROMETHAMINE 30 MG/ML
30 INJECTION, SOLUTION INTRAMUSCULAR; INTRAVENOUS ONCE
Status: COMPLETED | OUTPATIENT
Start: 2018-06-23 | End: 2018-06-23

## 2018-06-23 RX ADMIN — PROCHLORPERAZINE EDISYLATE 10 MG: 5 INJECTION INTRAMUSCULAR; INTRAVENOUS at 07:34

## 2018-06-23 RX ADMIN — KETOROLAC TROMETHAMINE 30 MG: 30 INJECTION, SOLUTION INTRAMUSCULAR at 07:29

## 2018-06-23 RX ADMIN — SODIUM CHLORIDE 1000 ML: 9 INJECTION, SOLUTION INTRAVENOUS at 07:28

## 2018-06-23 ASSESSMENT — ENCOUNTER SYMPTOMS
CONSTIPATION: 0
SORE THROAT: 0
FEVER: 0
VOMITING: 1
HEADACHES: 0
ANOREXIA: 0
FLATUS: 0
COUGH: 0
NECK STIFFNESS: 0
CHILLS: 0
HEMATOCHEZIA: 0
DYSURIA: 0
CONFUSION: 0
BELCHING: 0
ARTHRALGIAS: 0
HEMATEMESIS: 0
NAUSEA: 1
DIFFICULTY URINATING: 0
COLOR CHANGE: 0
SHORTNESS OF BREATH: 0
EYE REDNESS: 0
FATIGUE: 0
ABDOMINAL PAIN: 1
DIARRHEA: 0
HEMATURIA: 0
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
## 2018-06-23 NOTE — ED NOTES
Pt reported left quadrant pain started after eating a full meal at around 10pm. He reported that patient threw up 5 times of previously ingested food. Denies fever and diarrhea

## 2018-06-23 NOTE — ED NOTES
Pt declined discharge vitals, stable and ambulatory. Pain comfortably managed. Stated understanding of all discharge instructions.

## 2018-06-23 NOTE — ED AVS SNAPSHOT
[facility], [place], Emergency Department    [address]    [place] [address]    Phone:  [phone]    Fax:  [phone]                                       Kianna Lux   MRN: [id number]    Department:  [facility], Emergency Department   Date of Visit:  6/23/2018           After Visit Summary Signature Page     I have received my discharge instructions, and my questions have been answered. I have discussed any challenges I see with this plan with the nurse or doctor.    ..........................................................................................................................................  Patient/Patient Representative Signature      ..........................................................................................................................................  Patient Representative Print Name and Relationship to Patient    ..................................................               ................................................  Date                                            Time    ..........................................................................................................................................  Reviewed by Signature/Title    ...................................................              ..............................................  Date                                                            Time

## 2018-06-23 NOTE — ED AVS SNAPSHOT
" [facility], Emergency Department    [address]    [place] [address]    Phone:  [phone]    Fax:  [phone]                                       Kianna Lux   MRN: [id number]    Department:  [facility], Emergency Department   Date of Visit:  6/23/2018           Patient Information     Date Of Birth          1980        Your diagnoses for this visit were:     Renal colic     Ureteral stone        You were seen by Valente Garcia MD.        Discharge Instructions          Please make an appointment to follow up with [Urology Clinic (phone: [phone])] [in 10-20 days] [ even if entirely better.]* KIDNEY STONE (w/ Colic)    The sharp cramping pain and nausea/vomiting that you have is due to a small stone which has formed in the kidney and is now passing down a narrow tube (ureter) on its way to your bladder. Once it reaches your bladder, the pain will stop. The stone may pass in your urine stream in one piece. [The size may be 1/16\" to 1/4\" (1-6mm)]. Or, the stone may also break up into nahid fragments which you may not even notice.  Once you have had a kidney stone there is a risk for recurrence in the future.  HOME CARE:      Drink lots of fluid (at least 8-10 glasses of water a day).    Most stones will pass on their own, but may take from a few hours to a few days.    Each time you urinate, do so in a jar. Pour the urine from the jar through the strainer and into the toilet. Continue doing this until 24 hours after your pain stops. By then, if there was a kidney stone, it should pass from your bladder. Some stones dissolve into sand-like particles and pass right through the strainer. In that case, you won't ever see a stone.    Save any stone that you find in the strainer and bring it to your doctor for analysis. It may be possible to prevent certain types of stones from forming. Therefore, it is important to know what kind of stone you have.    Try to stay as active as possible " since this will help the stone pass. Do not stay in bed unless your pain prevents you from getting up. You may notice a red, pink or brown color to your urine. This is normal while passing a kidney stone.  FOLLOW UP with your doctor or return to this facility if the pain lasts more than 48 hours.  GET PROMPT MEDICAL ATTENTION if any of the following occur:    Pain that is not controlled by the medicine given    Repeated vomiting or unable to keep down fluids    Weakness, dizziness or fainting    Fever over 101  F (38.3  C)    Passage of solid red or brown urine (can't see through it) or urine with lots of blood clots    Unable to pass urine for 8 hours and increasing bladder pressure    9787-0991 [employer]. [address] [place] [address], [place] [address]. All rights reserved. This information is not intended as a substitute for professional medical care. Always follow your healthcare professional's instructions.  This information has been modified by your health care provider with permission from the publisher.      Understanding Kidney Stones  Your kidneys are bean-shaped organs. They help filter extra salts, waste, and water from your body. You need to drink enough water every day to help flush the extra salts into your urine.     What are kidney stones?  Kidney stones are made up of chemical crystals that separate out from urine. These crystals clump together to make stones. They form in the calyx of the kidney. They may stay in the kidney or move into the urinary tract.   Why kidney stones form  Kidneys form stones for many reasons. If you don t drink enough water, for instance, you won t have enough urine to dilute chemicals. Then the chemicals may form crystals, which can develop into stones. Here are some reasons why kidney stones form:    Fluid loss (dehydration). This can concentrate urine, causing stones to form.    Certain foods. Some foods contain large amounts of the chemicals that sometimes  crystallize into stones. Eating foods that contain a lot of meat or salt can lead to more kidney stones.    Kidney infections. These infections foster stones by slowing urine flow or changing the acid balance of your urine.    Family history. If family members have had kidney stones, you re more likely to have them, too.    A lack of certain substances in your urine. Some substances can help protect you from forming stones. If you don t have enough of these in your urine, stone formation can increase.  Where stones form  Stones begin in the cup-shaped part of the kidney (calyx). Some stay in the calyx and grow. Others move into the kidney, pelvis, or into the ureter. There they can lodge, block the flow of urine, and cause pain.  Symptoms  Many stones cause sudden and severe pain and bloody urine. Others cause nausea or frequent, burning urination. Symptoms often depend on your stone s size and location. Fever may indicate a serious infection. Call your healthcare provider right away if you develop a fever.  Date Last Reviewed: 1/1/2017 2000-2017 [employer]. [address] [place] [address]. All rights reserved. This information is not intended as a substitute for professional medical care. Always follow your healthcare professional's instructions.          Treating Kidney Stones: Expectant Therapy  Most kidney stones are about the size of a grape seed. Stones of this size are small enough to pass naturally. Once it is passed, a stone can be analyzed. This wait-and-see approach is called expectant therapy. Small stones can often be passed with expectant therapy. If pain is a problem, ask your healthcare provider about pain medicines. Then follow his or her directions on how much water to drink. Drinking more water creates more urine to flush out your stone. Also be sure to strain your urine. Take any stones you pass to your provider for analysis.    Drink lots of water  Drinking lots of  water may help your stone pass. Water also dilutes the chemicals in your urine. This reduces your risk of forming new stones. You may be told to drink 8, 12-ounce glasses of water a day. Avoid liquids that dehydrate you, such as those containing caffeine or alcohol.  Strain your urine  Straining your urine lets you collect your stone for analysis. Use the strainer each time you urinate. Strain your urine for as long as your healthcare provider suggests. Watch for brown, tan, gold, or black specks or tiny salomón. These may be kidney stones.  Take your medicine  Your healthcare provider may give you medicine that makes it more likely for you to pass the kidney stone.   Follow up with your healthcare provider  Follow up by taking any stones you find to your provider for analysis. The type of stone you have determines your diet and prevention program. You may need more tests in the future. These tests will ensure that new stones are not forming.  Date Last Reviewed: 1/1/2017 2000-2017 [employer]. [address] [place] [address]. All rights reserved. This information is not intended as a substitute for professional medical care. Always follow your healthcare professional's instructions.          24 Hour Appointment Hotline       To make an appointment at any [facility], call [phone] ([phone]). If you don't have a family doctor or clinic, we will help you find one. [facility] are conveniently located to serve the needs of you and your family.             Review of your medicines      START taking        Dose / Directions Last dose taken    HYDROcodone-acetaminophen 5-325 MG per tablet   Commonly known as:  NORCO   Dose:  1 tablet   Quantity:  10 tablet        Take 1 tablet by mouth every 6 hours as needed for severe pain   Refills:  0        ibuprofen 600 MG tablet   Commonly known as:  ADVIL/MOTRIN   Dose:  600 mg   Quantity:  20 tablet        Take 1 tablet (600 mg)  by mouth every 6 hours as needed for pain   Refills:  0        tamsulosin 0.4 MG capsule   Commonly known as:  FLOMAX   Dose:  0.4 mg   Quantity:  10 capsule        Take 1 capsule (0.4 mg) by mouth daily for 10 doses   Refills:  0                Information about OPIOIDS     PRESCRIPTION OPIOIDS: WHAT YOU NEED TO KNOW   We gave you an opioid (narcotic) pain medicine. It is important to manage your pain, but opioids are not always the best choice. You should first try all the other options your care team gave you. Take this medicine for as short a time (and as few doses) as possible.     These medicines have risks:    DO NOT drive when on new or higher doses of pain medicine. These medicines can affect your alertness and reaction times, and you could be arrested for driving under the influence (DUI). If you need to use opioids long-term, talk to your care team about driving.    DO NOT operate heave machinery    DO NOT do any other dangerous activities while taking these medicines.     DO NOT drink any alcohol while taking these medicines.      If the opioid prescribed includes acetaminophen, DO NOT take with any other medicines that contain acetaminophen. Read all labels carefully. Look for the word  acetaminophen  or  Tylenol.  Ask your pharmacist if you have questions or are unsure.    You can get addicted to pain medicines, especially if you have a history of addiction (chemical, alcohol or substance dependence). Talk to your care team about ways to reduce this risk.    Store your pills in a secure place, locked if possible. We will not replace any lost or stolen medicine. If you don t finish your medicine, please throw away (dispose) as directed by your pharmacist. The [place] [employer] has more information about safe disposal: https://www.pca.[facility].[place].us/living-green/managing-unwanted-medications.     All opioids tend to cause constipation. Drink plenty of water and eat foods that have a lot of  fiber, such as fruits, vegetables, prune juice, apple juice and high-fiber cereal. Take a laxative (Miralax, milk of magnesia, Colace, Senna) if you don t move your bowels at least every other day.         Prescriptions were sent or printed at these locations (3 Prescriptions)                   Other Prescriptions                Printed at Department/Unit printer (3 of 3)         HYDROcodone-acetaminophen (NORCO) 5-325 MG per tablet               ibuprofen (ADVIL/MOTRIN) 600 MG tablet               tamsulosin (FLOMAX) 0.4 MG capsule                Procedures and tests performed during your visit     Abd/pelvis CT no contrast - Stone Protocol    Abdomen XR, 2 vw, flat and upright    CBC with platelets differential    Comprehensive metabolic panel    Drug abuse screen 6 urine (tox)    Lactic acid    Lipase    Strain urine    UA with Microscopic reflex to Culture      Orders Needing Specimen Collection     None      Pending Results     No orders found from 6/21/2018 to 6/24/2018.            Pending Culture Results     No orders found from 6/21/2018 to 6/24/2018.            Pending Results Instructions     If you had any lab results that were not finalized at the time of your Discharge, you can call the ED Lab Result RN at [phone]. You will be contacted by this team for any positive Lab results or changes in treatment. The nurses are available 7 days a week from 10A to 6:30P.  You can leave a message 24 hours per day and they will return your call.        Thank you for choosing [facility]       Thank you for choosing [facility] for your care. Our goal is always to provide you with excellent care. Hearing back from our patients is one way we can continue to improve our services. Please take a few minutes to complete the written survey that you may receive in the mail after you visit with us. Thank you!        [employer] Information     [employer] lets you send messages to your doctor, view your test results, renew your  "prescriptions, schedule appointments and more. To sign up, go to [email or web address] . Click on \"Log in\" on the left side of the screen, which will take you to the Welcome page. Then click on \"Sign up Now\" on the right side of the page.     You will be asked to enter the access code listed below, as well as some personal information. Please follow the directions to create your username and password.     Your access code is: [email or web address]  Expires: 2018 11:13 AM     Your access code will  in 90 days. If you need help or a new code, please call your [facility] clinic or [phone].        Care EveryWhere ID     This is your Care EveryWhere ID. This could be used by other organizations to access your [facility] medical records  [id number]        Equal Access to Services     PADILLA CHAUDHARI : Tao High, cristopher beckett, shereen quintanilla, sandra lentz . So [facility] [phone].    ATENCIÓN: Si habla español, tiene a robins disposición servicios gratuitos de asistencia lingüística. Llame al [phone].    We comply with applicable federal civil rights laws and [place] laws. We do not discriminate on the basis of race, color, national origin, age, disability, sex, sexual orientation, or gender identity.            After Visit Summary       This is your record. Keep this with you and show to your community pharmacist(s) and doctor(s) at your next visit.                  "

## 2018-06-23 NOTE — DISCHARGE INSTRUCTIONS
"   Please make an appointment to follow up with [Urology Clinic (phone: [phone])] [in 10-20 days] [ even if entirely better.]* KIDNEY STONE (w/ Colic)    The sharp cramping pain and nausea/vomiting that you have is due to a small stone which has formed in the kidney and is now passing down a narrow tube (ureter) on its way to your bladder. Once it reaches your bladder, the pain will stop. The stone may pass in your urine stream in one piece. [The size may be 1/16\" to 1/4\" (1-6mm)]. Or, the stone may also break up into nahid fragments which you may not even notice.  Once you have had a kidney stone there is a risk for recurrence in the future.  HOME CARE:      Drink lots of fluid (at least 8-10 glasses of water a day).    Most stones will pass on their own, but may take from a few hours to a few days.    Each time you urinate, do so in a jar. Pour the urine from the jar through the strainer and into the toilet. Continue doing this until 24 hours after your pain stops. By then, if there was a kidney stone, it should pass from your bladder. Some stones dissolve into sand-like particles and pass right through the strainer. In that case, you won't ever see a stone.    Save any stone that you find in the strainer and bring it to your doctor for analysis. It may be possible to prevent certain types of stones from forming. Therefore, it is important to know what kind of stone you have.    Try to stay as active as possible since this will help the stone pass. Do not stay in bed unless your pain prevents you from getting up. You may notice a red, pink or brown color to your urine. This is normal while passing a kidney stone.  FOLLOW UP with your doctor or return to this facility if the pain lasts more than 48 hours.  GET PROMPT MEDICAL ATTENTION if any of the following occur:    Pain that is not controlled by the medicine given    Repeated vomiting or unable to keep down fluids    Weakness, dizziness or " fainting    Fever over 101  F (38.3  C)    Passage of solid red or brown urine (can't see through it) or urine with lots of blood clots    Unable to pass urine for 8 hours and increasing bladder pressure    9003-1829 [employer]. [address] [place] [address], [place] [address]. All rights reserved. This information is not intended as a substitute for professional medical care. Always follow your healthcare professional's instructions.  This information has been modified by your health care provider with permission from the publisher.      Understanding Kidney Stones  Your kidneys are bean-shaped organs. They help filter extra salts, waste, and water from your body. You need to drink enough water every day to help flush the extra salts into your urine.     What are kidney stones?  Kidney stones are made up of chemical crystals that separate out from urine. These crystals clump together to make stones. They form in the calyx of the kidney. They may stay in the kidney or move into the urinary tract.   Why kidney stones form  Kidneys form stones for many reasons. If you don t drink enough water, for instance, you won t have enough urine to dilute chemicals. Then the chemicals may form crystals, which can develop into stones. Here are some reasons why kidney stones form:    Fluid loss (dehydration). This can concentrate urine, causing stones to form.    Certain foods. Some foods contain large amounts of the chemicals that sometimes crystallize into stones. Eating foods that contain a lot of meat or salt can lead to more kidney stones.    Kidney infections. These infections foster stones by slowing urine flow or changing the acid balance of your urine.    Family history. If family members have had kidney stones, you re more likely to have them, too.    A lack of certain substances in your urine. Some substances can help protect you from forming stones. If you don t have enough of these in your urine, stone  formation can increase.  Where stones form  Stones begin in the cup-shaped part of the kidney (calyx). Some stay in the calyx and grow. Others move into the kidney, pelvis, or into the ureter. There they can lodge, block the flow of urine, and cause pain.  Symptoms  Many stones cause sudden and severe pain and bloody urine. Others cause nausea or frequent, burning urination. Symptoms often depend on your stone s size and location. Fever may indicate a serious infection. Call your healthcare provider right away if you develop a fever.  Date Last Reviewed: 1/1/2017 2000-2017 [employer]. [address] [place] [address]. All rights reserved. This information is not intended as a substitute for professional medical care. Always follow your healthcare professional's instructions.          Treating Kidney Stones: Expectant Therapy  Most kidney stones are about the size of a grape seed. Stones of this size are small enough to pass naturally. Once it is passed, a stone can be analyzed. This wait-and-see approach is called expectant therapy. Small stones can often be passed with expectant therapy. If pain is a problem, ask your healthcare provider about pain medicines. Then follow his or her directions on how much water to drink. Drinking more water creates more urine to flush out your stone. Also be sure to strain your urine. Take any stones you pass to your provider for analysis.    Drink lots of water  Drinking lots of water may help your stone pass. Water also dilutes the chemicals in your urine. This reduces your risk of forming new stones. You may be told to drink 8, 12-ounce glasses of water a day. Avoid liquids that dehydrate you, such as those containing caffeine or alcohol.  Strain your urine  Straining your urine lets you collect your stone for analysis. Use the strainer each time you urinate. Strain your urine for as long as your healthcare provider suggests. Watch for brown, tan, gold,  or black specks or tiny salomón. These may be kidney stones.  Take your medicine  Your healthcare provider may give you medicine that makes it more likely for you to pass the kidney stone.   Follow up with your healthcare provider  Follow up by taking any stones you find to your provider for analysis. The type of stone you have determines your diet and prevention program. You may need more tests in the future. These tests will ensure that new stones are not forming.  Date Last Reviewed: 1/1/2017 2000-2017 [employer]. [address] [place] [address], [place] [address]. All rights reserved. This information is not intended as a substitute for professional medical care. Always follow your healthcare professional's instructions.

## 2018-07-03 ENCOUNTER — OFFICE VISIT (OUTPATIENT)
Dept: OPHTHALMOLOGY | Facility: CLINIC | Age: 38
End: 2018-07-03
Attending: OPHTHALMOLOGY
Payer: COMMERCIAL

## 2018-07-03 DIAGNOSIS — H20.9 UVEITIS: Primary | ICD-10-CM

## 2018-07-03 PROCEDURE — 92240 ICG ANGIOGRAPHY I&R UNI/BI: CPT | Mod: ZF | Performed by: OPHTHALMOLOGY

## 2018-07-03 PROCEDURE — 92235 FLUORESCEIN ANGRPH MLTIFRAME: CPT | Mod: ZF | Performed by: OPHTHALMOLOGY

## 2018-07-03 PROCEDURE — 92242 FLUORESCEIN&ICG ANGIOGRAPHY: CPT | Mod: ZF

## 2018-07-03 PROCEDURE — 92134 CPTRZ OPH DX IMG PST SGM RTA: CPT | Mod: ZF | Performed by: OPHTHALMOLOGY

## 2018-07-03 PROCEDURE — G0463 HOSPITAL OUTPT CLINIC VISIT: HCPCS | Mod: ZF

## 2018-07-03 RX ORDER — PREDNISOLONE ACETATE 10 MG/ML
1-2 SUSPENSION/ DROPS OPHTHALMIC
Qty: 1 BOTTLE | Refills: 0 | Status: SHIPPED | OUTPATIENT
Start: 2018-07-03 | End: 2018-07-03

## 2018-07-03 RX ORDER — PREDNISOLONE ACETATE 10 MG/ML
1 SUSPENSION/ DROPS OPHTHALMIC
COMMUNITY
Start: 2018-07-02 | End: 2018-07-03

## 2018-07-03 RX ORDER — CYCLOPENTOLATE HYDROCHLORIDE 10 MG/ML
1 SOLUTION/ DROPS OPHTHALMIC
COMMUNITY
Start: 2018-07-02 | End: 2018-07-03

## 2018-07-03 RX ORDER — ATROPINE SULFATE 10 MG/ML
1-2 SOLUTION/ DROPS OPHTHALMIC 2 TIMES DAILY
Qty: 1 BOTTLE | Refills: 11 | Status: SHIPPED | OUTPATIENT
Start: 2018-07-03 | End: 2018-07-03

## 2018-07-03 ASSESSMENT — CONF VISUAL FIELD
OS_SUPERIOR_NASAL_RESTRICTION: 1
OS_INFERIOR_TEMPORAL_RESTRICTION: 1
METHOD: COUNTING FINGERS
OD_NORMAL: 1
OS_SUPERIOR_TEMPORAL_RESTRICTION: 1
OS_INFERIOR_NASAL_RESTRICTION: 1

## 2018-07-03 ASSESSMENT — TONOMETRY
OD_IOP_MMHG: 19
IOP_METHOD: TONOPEN
OS_IOP_MMHG: 15

## 2018-07-03 ASSESSMENT — VISUAL ACUITY
METHOD: SNELLEN - LINEAR
OS_SC: NLP
OD_SC: 20/25

## 2018-07-03 ASSESSMENT — SLIT LAMP EXAM - LIDS: COMMENTS: NORMAL

## 2018-07-03 ASSESSMENT — REFRACTION_WEARINGRX
OS_SPHERE: PLANO
OD_SPHERE: PLANO

## 2018-07-03 ASSESSMENT — EXTERNAL EXAM - RIGHT EYE: OD_EXAM: NORMAL

## 2018-07-03 ASSESSMENT — CUP TO DISC RATIO: OD_RATIO: 0.4

## 2018-07-03 ASSESSMENT — EXTERNAL EXAM - LEFT EYE: OS_EXAM: NORMAL

## 2018-07-03 NOTE — MR AVS SNAPSHOT
After Visit Summary   7/3/2018    Kianna Lux    MRN: [id number]           Patient Information     Date Of Birth          1980        Visit Information        Provider Department      7/3/2018 3:00 PM Leslie Fonseca MD [facility]        Today's Diagnoses     Uveitis    -  1       Follow-ups after your visit        Follow-up notes from your care team     Return in about 1 week (around 7/10/2018).      Your next 10 appointments already scheduled     Jul 10, 2018  3:00 PM CDT   RETURN RETINA with Leslie Fonseca MD   Eye Clinic ([facility])    [address]  [facility]  [facility] [address] [phone]              Future tests that were ordered for you today     Open Future Orders        Priority Expected Expires Ordered    HLA-B27 Typing Routine  10/1/2018 7/3/2018    XR Chest 1 View Routine 7/3/2018 7/3/2019 7/3/2018    Treponema Abs w Reflex to RPR and Titer Routine  10/1/2018 7/3/2018    Angiotensin converting enzyme Routine  10/1/2018 7/3/2018    M Tuberculosis by Quantiferon Routine  10/1/2018 7/3/2018            Who to contact     Please call your clinic at [phone] to:    Ask questions about your health    Make or cancel appointments    Discuss your medicines    Learn about your test results    Speak to your doctor            Additional Information About Your Visit        MyChart Information     zSoupt is an electronic gateway that provides easy, online access to your medical records. With Insplorion, you can request a clinic appointment, read your test results, renew a prescription or communicate with your care team.     To sign up for zSoupt visit the website at [email or web address]   You will be asked to enter the access code listed below, as well as some personal information. Please follow the directions to create your username and password.     Your access code is: [email or web address]  Expires: 9/21/2018 11:13  AM     Your access code will  in 90 days. If you need help or a new code, please contact your [facility] or call [phone] for assistance.        Care EveryWhere ID     This is your Care EveryWhere ID. This could be used by other organizations to access your [place] medical records  [id number]         Blood Pressure from Last 3 Encounters:   18 (!) 154/100    Weight from Last 3 Encounters:   18 72.6 kg (160 lb)              We Performed the Following     Fluorescein Angiography OU (both eyes)     FTA-ABS ([facility])     ICG Angiography OU (both eyes)     OCT Retina Spectralis OU (both eyes)     RPR W/REFLEX TITER & CONFIRM          Today's Medication Changes          These changes are accurate as of 7/3/18  5:44 PM.  If you have any questions, ask your nurse or doctor.               Stop taking these medicines if you haven't already. Please contact your care team if you have questions.     cyclopentolate 1 % ophthalmic solution   Commonly known as:  CYCLOGYL   Stopped by:  Leslie Fonseca MD           prednisoLONE acetate 1 % ophthalmic susp   Commonly known as:  PRED FORTE   Stopped by:  Leslie Fonseca MD                    Primary Care Provider Fax #    Physician No Ref-Primary [phone]       No address on file        Equal Access to Services     PADILLA CHAUDHARI : Tao alvarezo Solazara, waaxda luqadaha, qaybta kaalmada adejordanyada, sandra shore. So [facility] [phone].    ATENCIÓN: Si habla español, tiene a robins disposición servicios gratuitos de asistencia lingüística. Llame al [phone].    We comply with applicable federal civil rights laws and [place] laws. We do not discriminate on the basis of race, color, national origin, age, disability, sex, sexual orientation, or gender identity.            Thank you!     Thank you for choosing EYE CLINIC  for your care. Our goal is always to provide you with  excellent care. Hearing back from our patients is one way we can continue to improve our services. Please take a few minutes to complete the written survey that you may receive in the mail after your visit with us. Thank you!             Your Updated Medication List - Protect others around you: Learn how to safely use, store and throw away your medicines at www.disposemymeds.org.          This list is accurate as of 7/3/18  5:44 PM.  Always use your most recent med list.                   Brand Name Dispense Instructions for use Diagnosis    HYDROcodone-acetaminophen 5-325 MG per tablet    NORCO    10 tablet    Take 1 tablet by mouth every 6 hours as needed for severe pain        ibuprofen 600 MG tablet    ADVIL/MOTRIN    20 tablet    Take 1 tablet (600 mg) by mouth every 6 hours as needed for pain        tamsulosin 0.4 MG capsule    FLOMAX    10 capsule    Take 1 capsule (0.4 mg) by mouth daily for 10 doses

## 2018-07-03 NOTE — NURSING NOTE
Chief Complaints and History of Present Illnesses   Patient presents with     Follow Up For     Iritis RE     HPI    Affected eye(s):  Right   Symptoms:     No floaters   No tearing   No Dryness   No itching         Do you have eye pain now?:  Yes   Location:  OD   Pain Frequency:  Constant      Comments:  Pt states that he woke up 3 days ago with pressure like eye pain in his RE. Pt seeing flashes in RE for the past 3 days, vision has gotten much worse. Redness in RE, but no change since yesterday.  Pt still having pressure like pain in his RE today, no worse since yesterday.     Mingo HANNON July 3, 2018 3:29 PM
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
## 2018-07-05 ENCOUNTER — RESULTS ONLY (OUTPATIENT)
Dept: OTHER | Facility: CLINIC | Age: 38
End: 2018-07-05

## 2018-07-05 DIAGNOSIS — H20.9 UVEITIS: ICD-10-CM

## 2018-07-05 PROCEDURE — 81374 HLA I TYPING 1 ANTIGEN LR: CPT

## 2018-07-05 PROCEDURE — 86480 TB TEST CELL IMMUN MEASURE: CPT | Performed by: OPHTHALMOLOGY

## 2018-07-05 PROCEDURE — 86780 TREPONEMA PALLIDUM: CPT | Performed by: OPHTHALMOLOGY

## 2018-07-06 ENCOUNTER — TELEPHONE (OUTPATIENT)
Dept: OPHTHALMOLOGY | Facility: CLINIC | Age: 38
End: 2018-07-06

## 2018-07-06 LAB
HLA-B27 QL NAA+PROBE: NORMAL
T PALLIDUM AB SER QL: NONREACTIVE

## 2018-07-06 NOTE — TELEPHONE ENCOUNTER
Called patient to discuss symptoms following evaluation in retina clinic this past Tuesday.  He says that he feels much better today.  He is not getting the steroid drop in as often as instructed because he feels like it irritates his eye a little / is hard to get in.  Discussed that the steroid drop is very important to the treatment plan.  Offered appointment today should patient want to be seen to discuss this in person.  Patient happy with current set up appointment (next week).  He did ask if any of his labs have been resulted and I did inform him that they are still pending.  All questions answered at this time.

## 2018-07-07 LAB — ACE SERPL-CCNC: 27 U/L (ref 9–67)

## 2018-07-09 LAB
M TB TUBERC IFN-G BLD QL: POSITIVE
M TB TUBERC IFN-G/MITOGEN IGNF BLD: 2.24 IU/ML

## 2018-07-10 ENCOUNTER — OFFICE VISIT (OUTPATIENT)
Dept: OPHTHALMOLOGY | Facility: CLINIC | Age: 38
End: 2018-07-10
Attending: OPHTHALMOLOGY
Payer: COMMERCIAL

## 2018-07-10 DIAGNOSIS — R76.12 POSITIVE QUANTIFERON-TB GOLD TEST: Primary | ICD-10-CM

## 2018-07-10 DIAGNOSIS — H20.9 UVEITIS: ICD-10-CM

## 2018-07-10 LAB
B LOCUS: NORMAL
B27TEST METHOD: NORMAL

## 2018-07-10 PROCEDURE — G0463 HOSPITAL OUTPT CLINIC VISIT: HCPCS | Mod: ZF

## 2018-07-10 ASSESSMENT — SLIT LAMP EXAM - LIDS: COMMENTS: NORMAL

## 2018-07-10 ASSESSMENT — CONF VISUAL FIELD
OS_INFERIOR_NASAL_RESTRICTION: 1
OS_SUPERIOR_TEMPORAL_RESTRICTION: 1
OS_SUPERIOR_NASAL_RESTRICTION: 1
OS_INFERIOR_TEMPORAL_RESTRICTION: 1

## 2018-07-10 ASSESSMENT — REFRACTION_WEARINGRX
OD_SPHERE: PLANO
OS_SPHERE: PLANO

## 2018-07-10 ASSESSMENT — TONOMETRY
OS_IOP_MMHG: 17
IOP_METHOD: TONOPEN
OD_IOP_MMHG: 17

## 2018-07-10 ASSESSMENT — VISUAL ACUITY
OD_PH_SC: 20/30+2
OD_SC+: -2
METHOD: SNELLEN - LINEAR
OS_SC: NLP
OD_SC: 20/30

## 2018-07-10 ASSESSMENT — EXTERNAL EXAM - RIGHT EYE: OD_EXAM: NORMAL

## 2018-07-10 ASSESSMENT — EXTERNAL EXAM - LEFT EYE: OS_EXAM: NORMAL
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
## 2018-07-10 NOTE — NURSING NOTE
Chief Complaints and History of Present Illnesses   Patient presents with     Follow Up For     Acute anterior uveitis right eye      HPI    Affected eye(s):  Both   Symptoms:     Blurred vision   Redness   No photophobia         Do you have eye pain now?:  Yes   Location:  OD   Pain Level:  Mild Pain (2)   Pain Frequency:  Intermittent      Comments:  Follow up for Acute anterior uveitis right eye.   ARCENIO Feliz 3:46 PM 07/10/2018

## 2018-07-10 NOTE — MR AVS SNAPSHOT
After Visit Summary   7/10/2018    Kianna Lux    MRN: [id number]           Patient Information     Date Of Birth          1980        Visit Information        Provider Department      7/10/2018 3:00 PM Leslie Fonseca MD [facility]        Today's Diagnoses     Positive QuantiFERON-TB Gold test    -  1    Uveitis          Care Instructions    Use prednisolone acetate drop every 4 hours while awake  (5-6 times per day) in the right eye          Follow-ups after your visit        Additional Services     INFECTIOUS DISEASE REFERRAL       Your provider has referred you to: [facility]: [facility] (Infectious Disease and HIV Clinic) [facility] [phone]   [email or web address]    Please be aware that coverage of these services is subject to the terms and limitations of your health insurance plan.  Call member services at your health plan with any benefit or coverage questions.      Please bring the following with you to your appointment:    (1) Any X-Rays, CTs or MRIs which have been performed.  Contact the facility where they were done to arrange for  prior to your scheduled appointment.    (2) List of current medications   (3) This referral request   (4) Any documents/labs given to you for this referral                  Follow-up notes from your care team     Return in about 2 weeks (around 7/24/2018).      Who to contact     Please call your clinic at [phone] to:    Ask questions about your health    Make or cancel appointments    Discuss your medicines    Learn about your test results    Speak to your doctor            Additional Information About Your Visit        MyChart Information     FreeDrive is an electronic gateway that provides easy, online access to your medical records. With FreeDrive, you can request a clinic appointment, read your test results, renew a prescription or communicate with your care team.     To  sign up for [employer] visit the website at [email or web address]   You will be asked to enter the access code listed below, as well as some personal information. Please follow the directions to create your username and password.     Your access code is: [email or web address]  Expires: 2018 11:13 AM     Your access code will  in 90 days. If you need help or a new code, please contact your [facility] or call [phone] for assistance.        Care EveryWhere ID     This is your Care EveryWhere ID. This could be used by other organizations to access your [place] medical records  [id number]         Blood Pressure from Last 3 Encounters:   18 (!) 154/100    Weight from Last 3 Encounters:   18 72.6 kg (160 lb)              We Performed the Following     INFECTIOUS DISEASE REFERRAL        Primary Care Provider Fax #    Physician No Ref-Primary [phone]       No address on file        Equal Access to Services     [facility] Center[facility]NA : Hadii aad ku hadasho Soomaali, waaxda luqadaha, qaybta kaalmada adeegyada, waxay johnin dee lentz . So [facility] [phone].    ATENCIÓN: Si habla español, tiene a robins disposición servicios gratuitos de asistencia lingüística. Llame al [phone].    We comply with applicable federal civil rights laws and [place] laws. We do not discriminate on the basis of race, color, national origin, age, disability, sex, sexual orientation, or gender identity.            Thank you!     Thank you for choosing EYE CLINIC  for your care. Our goal is always to provide you with excellent care. Hearing back from our patients is one way we can continue to improve our services. Please take a few minutes to complete the written survey that you may receive in the mail after your visit with us. Thank you!             Your Updated Medication List - Protect others around you: Learn how to safely use, store and throw away your medicines at  www.disposemymeds.org.          This list is accurate as of 7/10/18  4:59 PM.  Always use your most recent med list.                   Brand Name Dispense Instructions for use Diagnosis    HYDROcodone-acetaminophen 5-325 MG per tablet    NORCO    10 tablet    Take 1 tablet by mouth every 6 hours as needed for severe pain        ibuprofen 600 MG tablet    ADVIL/MOTRIN    20 tablet    Take 1 tablet (600 mg) by mouth every 6 hours as needed for pain

## 2019-12-10 ENCOUNTER — HOSPITAL ENCOUNTER (INPATIENT)
Facility: CLINIC | Age: 39
LOS: 2 days | Discharge: HOME | End: 2019-12-13
Attending: EMERGENCY MEDICINE | Admitting: SURGERY
Payer: MEDICAID

## 2019-12-10 ENCOUNTER — APPOINTMENT (OUTPATIENT)
Dept: CT IMAGING | Facility: CLINIC | Age: 39
End: 2019-12-10
Payer: MEDICAID

## 2019-12-10 DIAGNOSIS — K56.609 SMALL BOWEL OBSTRUCTION (H): ICD-10-CM

## 2019-12-10 DIAGNOSIS — K45.8 INTERNAL HERNIA: ICD-10-CM

## 2019-12-10 LAB
ALBUMIN SERPL-MCNC: 4.3 G/DL (ref 3.4–5)
ALP SERPL-CCNC: 66 U/L (ref 40–150)
ALT SERPL W P-5'-P-CCNC: 22 U/L (ref 0–70)
ANION GAP SERPL CALCULATED.3IONS-SCNC: 6 MMOL/L (ref 3–14)
AST SERPL W P-5'-P-CCNC: 16 U/L (ref 0–45)
BASOPHILS # BLD AUTO: 0 10E9/L (ref 0–0.2)
BASOPHILS NFR BLD AUTO: 0.3 %
BILIRUB SERPL-MCNC: 0.8 MG/DL (ref 0.2–1.3)
BUN SERPL-MCNC: 15 MG/DL (ref 7–30)
CALCIUM SERPL-MCNC: 9.4 MG/DL (ref 8.5–10.1)
CHLORIDE SERPL-SCNC: 101 MMOL/L (ref 94–109)
CO2 SERPL-SCNC: 32 MMOL/L (ref 20–32)
CREAT SERPL-MCNC: 1.07 MG/DL (ref 0.66–1.25)
DIFFERENTIAL METHOD BLD: NORMAL
EOSINOPHIL # BLD AUTO: 0.1 10E9/L (ref 0–0.7)
EOSINOPHIL NFR BLD AUTO: 0.8 %
ERYTHROCYTE [DISTWIDTH] IN BLOOD BY AUTOMATED COUNT: 13 % (ref 10–15)
GFR SERPL CREATININE-BSD FRML MDRD: 88 ML/MIN/{1.73_M2}
GLUCOSE SERPL-MCNC: 103 MG/DL (ref 70–99)
HCT VFR BLD AUTO: 49.4 % (ref 40–53)
HGB BLD-MCNC: 16.7 G/DL (ref 13.3–17.7)
IMM GRANULOCYTES # BLD: 0 10E9/L (ref 0–0.4)
IMM GRANULOCYTES NFR BLD: 0.2 %
LACTATE BLD-SCNC: 1 MMOL/L (ref 0.7–2)
LIPASE SERPL-CCNC: 194 U/L (ref 73–393)
LYMPHOCYTES # BLD AUTO: 2.1 10E9/L (ref 0.8–5.3)
LYMPHOCYTES NFR BLD AUTO: 19.4 %
MCH RBC QN AUTO: 28.6 PG (ref 26.5–33)
MCHC RBC AUTO-ENTMCNC: 33.8 G/DL (ref 31.5–36.5)
MCV RBC AUTO: 85 FL (ref 78–100)
MONOCYTES # BLD AUTO: 0.6 10E9/L (ref 0–1.3)
MONOCYTES NFR BLD AUTO: 5.8 %
NEUTROPHILS # BLD AUTO: 8.1 10E9/L (ref 1.6–8.3)
NEUTROPHILS NFR BLD AUTO: 73.5 %
NRBC # BLD AUTO: 0 10*3/UL
NRBC BLD AUTO-RTO: 0 /100
PLATELET # BLD AUTO: 361 10E9/L (ref 150–450)
POTASSIUM SERPL-SCNC: 3.3 MMOL/L (ref 3.4–5.3)
PROT SERPL-MCNC: 8.6 G/DL (ref 6.8–8.8)
RBC # BLD AUTO: 5.83 10E12/L (ref 4.4–5.9)
SODIUM SERPL-SCNC: 139 MMOL/L (ref 133–144)
WBC # BLD AUTO: 10.9 10E9/L (ref 4–11)

## 2019-12-10 PROCEDURE — 74177 CT ABD & PELVIS W/CONTRAST: CPT

## 2019-12-10 PROCEDURE — 83690 ASSAY OF LIPASE: CPT | Performed by: EMERGENCY MEDICINE

## 2019-12-10 PROCEDURE — 25800030 ZZH RX IP 258 OP 636: Performed by: EMERGENCY MEDICINE

## 2019-12-10 PROCEDURE — 85025 COMPLETE CBC W/AUTO DIFF WBC: CPT | Performed by: EMERGENCY MEDICINE

## 2019-12-10 PROCEDURE — 25000128 H RX IP 250 OP 636: Performed by: STUDENT IN AN ORGANIZED HEALTH CARE EDUCATION/TRAINING PROGRAM

## 2019-12-10 PROCEDURE — 25800030 ZZH RX IP 258 OP 636: Performed by: STUDENT IN AN ORGANIZED HEALTH CARE EDUCATION/TRAINING PROGRAM

## 2019-12-10 PROCEDURE — 83605 ASSAY OF LACTIC ACID: CPT | Performed by: EMERGENCY MEDICINE

## 2019-12-10 PROCEDURE — 25000128 H RX IP 250 OP 636: Performed by: EMERGENCY MEDICINE

## 2019-12-10 PROCEDURE — 80053 COMPREHEN METABOLIC PANEL: CPT | Performed by: EMERGENCY MEDICINE

## 2019-12-10 RX ORDER — ONDANSETRON 2 MG/ML
4 INJECTION INTRAMUSCULAR; INTRAVENOUS EVERY 30 MIN PRN
Status: DISCONTINUED | OUTPATIENT
Start: 2019-12-10 | End: 2019-12-11 | Stop reason: DRUGHIGH

## 2019-12-10 RX ORDER — MORPHINE SULFATE 4 MG/ML
4 INJECTION, SOLUTION INTRAMUSCULAR; INTRAVENOUS
Status: COMPLETED | OUTPATIENT
Start: 2019-12-10 | End: 2019-12-10

## 2019-12-10 RX ORDER — SODIUM CHLORIDE 9 MG/ML
100 INJECTION, SOLUTION INTRAVENOUS ONCE
Status: COMPLETED | OUTPATIENT
Start: 2019-12-10 | End: 2019-12-10

## 2019-12-10 RX ORDER — IOPAMIDOL 755 MG/ML
100 INJECTION, SOLUTION INTRAVASCULAR ONCE
Status: COMPLETED | OUTPATIENT
Start: 2019-12-10 | End: 2019-12-10

## 2019-12-10 RX ORDER — SODIUM CHLORIDE 9 MG/ML
1000 INJECTION, SOLUTION INTRAVENOUS CONTINUOUS
Status: DISCONTINUED | OUTPATIENT
Start: 2019-12-10 | End: 2019-12-13 | Stop reason: HOSPADM

## 2019-12-10 RX ADMIN — MORPHINE SULFATE 4 MG: 4 INJECTION INTRAVENOUS at 22:37

## 2019-12-10 RX ADMIN — SODIUM CHLORIDE 59 ML: 9 INJECTION, SOLUTION INTRAVENOUS at 23:01

## 2019-12-10 RX ADMIN — SODIUM CHLORIDE 1000 ML: 9 INJECTION, SOLUTION INTRAVENOUS at 22:37

## 2019-12-10 RX ADMIN — IOPAMIDOL 75 ML: 755 INJECTION, SOLUTION INTRAVENOUS at 23:01

## 2019-12-10 RX ADMIN — ONDANSETRON 4 MG: 2 INJECTION INTRAMUSCULAR; INTRAVENOUS at 22:37

## 2019-12-11 ENCOUNTER — APPOINTMENT (OUTPATIENT)
Dept: GENERAL RADIOLOGY | Facility: CLINIC | Age: 39
End: 2019-12-11
Attending: STUDENT IN AN ORGANIZED HEALTH CARE EDUCATION/TRAINING PROGRAM
Payer: MEDICAID

## 2019-12-11 PROBLEM — R10.9 INTRACTABLE ABDOMINAL PAIN: Status: ACTIVE | Noted: 2019-12-11

## 2019-12-11 LAB
ANION GAP SERPL CALCULATED.3IONS-SCNC: 7 MMOL/L (ref 3–14)
BASOPHILS # BLD AUTO: 0.1 10E9/L (ref 0–0.2)
BASOPHILS NFR BLD AUTO: 0.5 %
BUN SERPL-MCNC: 14 MG/DL (ref 7–30)
CALCIUM SERPL-MCNC: 8.8 MG/DL (ref 8.5–10.1)
CHLORIDE SERPL-SCNC: 105 MMOL/L (ref 94–109)
CO2 SERPL-SCNC: 29 MMOL/L (ref 20–32)
CREAT SERPL-MCNC: 1.01 MG/DL (ref 0.66–1.25)
DIFFERENTIAL METHOD BLD: NORMAL
EOSINOPHIL # BLD AUTO: 0.1 10E9/L (ref 0–0.7)
EOSINOPHIL NFR BLD AUTO: 1.4 %
ERYTHROCYTE [DISTWIDTH] IN BLOOD BY AUTOMATED COUNT: 12.9 % (ref 10–15)
ERYTHROCYTE [DISTWIDTH] IN BLOOD BY AUTOMATED COUNT: 12.9 % (ref 10–15)
GFR SERPL CREATININE-BSD FRML MDRD: >90 ML/MIN/{1.73_M2}
GLUCOSE SERPL-MCNC: 94 MG/DL (ref 70–99)
HCT VFR BLD AUTO: 46.2 % (ref 40–53)
HCT VFR BLD AUTO: 47 % (ref 40–53)
HGB BLD-MCNC: 14.7 G/DL (ref 13.3–17.7)
HGB BLD-MCNC: 15.1 G/DL (ref 13.3–17.7)
IMM GRANULOCYTES # BLD: 0 10E9/L (ref 0–0.4)
IMM GRANULOCYTES NFR BLD: 0.3 %
LACTATE BLD-SCNC: 0.8 MMOL/L (ref 0.7–2)
LYMPHOCYTES # BLD AUTO: 2.3 10E9/L (ref 0.8–5.3)
LYMPHOCYTES NFR BLD AUTO: 24.7 %
MCH RBC QN AUTO: 27.2 PG (ref 26.5–33)
MCH RBC QN AUTO: 27.5 PG (ref 26.5–33)
MCHC RBC AUTO-ENTMCNC: 31.8 G/DL (ref 31.5–36.5)
MCHC RBC AUTO-ENTMCNC: 32.1 G/DL (ref 31.5–36.5)
MCV RBC AUTO: 85 FL (ref 78–100)
MCV RBC AUTO: 86 FL (ref 78–100)
MONOCYTES # BLD AUTO: 0.6 10E9/L (ref 0–1.3)
MONOCYTES NFR BLD AUTO: 6.9 %
NEUTROPHILS # BLD AUTO: 6.1 10E9/L (ref 1.6–8.3)
NEUTROPHILS NFR BLD AUTO: 66.2 %
NRBC # BLD AUTO: 0 10*3/UL
NRBC BLD AUTO-RTO: 0 /100
PLATELET # BLD AUTO: 317 10E9/L (ref 150–450)
PLATELET # BLD AUTO: 351 10E9/L (ref 150–450)
POTASSIUM SERPL-SCNC: 3.2 MMOL/L (ref 3.4–5.3)
RBC # BLD AUTO: 5.41 10E12/L (ref 4.4–5.9)
RBC # BLD AUTO: 5.5 10E12/L (ref 4.4–5.9)
SODIUM SERPL-SCNC: 140 MMOL/L (ref 133–144)
WBC # BLD AUTO: 7.5 10E9/L (ref 4–11)
WBC # BLD AUTO: 9.2 10E9/L (ref 4–11)

## 2019-12-11 PROCEDURE — 80048 BASIC METABOLIC PNL TOTAL CA: CPT | Performed by: EMERGENCY MEDICINE

## 2019-12-11 PROCEDURE — 25000132 ZZH RX MED GY IP 250 OP 250 PS 637: Performed by: STUDENT IN AN ORGANIZED HEALTH CARE EDUCATION/TRAINING PROGRAM

## 2019-12-11 PROCEDURE — 12000001 ZZH R&B MED SURG/OB UMMC

## 2019-12-11 PROCEDURE — 85027 COMPLETE CBC AUTOMATED: CPT | Performed by: STUDENT IN AN ORGANIZED HEALTH CARE EDUCATION/TRAINING PROGRAM

## 2019-12-11 PROCEDURE — 96361 HYDRATE IV INFUSION ADD-ON: CPT | Performed by: EMERGENCY MEDICINE

## 2019-12-11 PROCEDURE — 96375 TX/PRO/DX INJ NEW DRUG ADDON: CPT | Performed by: EMERGENCY MEDICINE

## 2019-12-11 PROCEDURE — 83605 ASSAY OF LACTIC ACID: CPT | Performed by: STUDENT IN AN ORGANIZED HEALTH CARE EDUCATION/TRAINING PROGRAM

## 2019-12-11 PROCEDURE — 85025 COMPLETE CBC W/AUTO DIFF WBC: CPT | Performed by: EMERGENCY MEDICINE

## 2019-12-11 PROCEDURE — 80048 BASIC METABOLIC PNL TOTAL CA: CPT | Performed by: STUDENT IN AN ORGANIZED HEALTH CARE EDUCATION/TRAINING PROGRAM

## 2019-12-11 PROCEDURE — 83605 ASSAY OF LACTIC ACID: CPT | Performed by: EMERGENCY MEDICINE

## 2019-12-11 PROCEDURE — 25800025 ZZH RX 258: Performed by: STUDENT IN AN ORGANIZED HEALTH CARE EDUCATION/TRAINING PROGRAM

## 2019-12-11 PROCEDURE — 25000128 H RX IP 250 OP 636: Performed by: STUDENT IN AN ORGANIZED HEALTH CARE EDUCATION/TRAINING PROGRAM

## 2019-12-11 PROCEDURE — 80048 BASIC METABOLIC PNL TOTAL CA: CPT | Performed by: SURGERY

## 2019-12-11 PROCEDURE — 96374 THER/PROPH/DIAG INJ IV PUSH: CPT | Mod: 59 | Performed by: EMERGENCY MEDICINE

## 2019-12-11 PROCEDURE — 25800030 ZZH RX IP 258 OP 636: Performed by: STUDENT IN AN ORGANIZED HEALTH CARE EDUCATION/TRAINING PROGRAM

## 2019-12-11 PROCEDURE — 84132 ASSAY OF SERUM POTASSIUM: CPT | Performed by: SURGERY

## 2019-12-11 PROCEDURE — 99285 EMERGENCY DEPT VISIT HI MDM: CPT | Mod: 25 | Performed by: EMERGENCY MEDICINE

## 2019-12-11 PROCEDURE — 99285 EMERGENCY DEPT VISIT HI MDM: CPT | Mod: Z6 | Performed by: EMERGENCY MEDICINE

## 2019-12-11 PROCEDURE — 40000986 XR ABDOMEN PORT 1 VW

## 2019-12-11 PROCEDURE — G0378 HOSPITAL OBSERVATION PER HR: HCPCS

## 2019-12-11 PROCEDURE — 36415 COLL VENOUS BLD VENIPUNCTURE: CPT | Performed by: STUDENT IN AN ORGANIZED HEALTH CARE EDUCATION/TRAINING PROGRAM

## 2019-12-11 RX ORDER — POTASSIUM CHLORIDE 750 MG/1
20-40 TABLET, EXTENDED RELEASE ORAL
Status: DISCONTINUED | OUTPATIENT
Start: 2019-12-11 | End: 2019-12-13 | Stop reason: HOSPADM

## 2019-12-11 RX ORDER — POTASSIUM CHLORIDE 7.45 MG/ML
10 INJECTION INTRAVENOUS
Status: DISCONTINUED | OUTPATIENT
Start: 2019-12-11 | End: 2019-12-13 | Stop reason: HOSPADM

## 2019-12-11 RX ORDER — ONDANSETRON 2 MG/ML
4 INJECTION INTRAMUSCULAR; INTRAVENOUS EVERY 6 HOURS PRN
Status: DISCONTINUED | OUTPATIENT
Start: 2019-12-11 | End: 2019-12-13 | Stop reason: HOSPADM

## 2019-12-11 RX ORDER — ONDANSETRON 4 MG/1
4 TABLET, ORALLY DISINTEGRATING ORAL EVERY 6 HOURS PRN
Status: DISCONTINUED | OUTPATIENT
Start: 2019-12-11 | End: 2019-12-13 | Stop reason: HOSPADM

## 2019-12-11 RX ORDER — POTASSIUM CL/LIDO/0.9 % NACL 10MEQ/0.1L
10 INTRAVENOUS SOLUTION, PIGGYBACK (ML) INTRAVENOUS
Status: DISCONTINUED | OUTPATIENT
Start: 2019-12-11 | End: 2019-12-13 | Stop reason: HOSPADM

## 2019-12-11 RX ORDER — POTASSIUM CHLORIDE 1.5 G/1.58G
20-40 POWDER, FOR SOLUTION ORAL
Status: DISCONTINUED | OUTPATIENT
Start: 2019-12-11 | End: 2019-12-13 | Stop reason: HOSPADM

## 2019-12-11 RX ORDER — OXYCODONE HYDROCHLORIDE 5 MG/1
5-10 TABLET ORAL EVERY 4 HOURS PRN
Status: DISCONTINUED | OUTPATIENT
Start: 2019-12-11 | End: 2019-12-13 | Stop reason: HOSPADM

## 2019-12-11 RX ORDER — ACETAMINOPHEN 325 MG/1
975 TABLET ORAL EVERY 8 HOURS
Status: DISCONTINUED | OUTPATIENT
Start: 2019-12-11 | End: 2019-12-13 | Stop reason: HOSPADM

## 2019-12-11 RX ORDER — DEXTROSE MONOHYDRATE, SODIUM CHLORIDE, AND POTASSIUM CHLORIDE 50; 1.49; 9 G/1000ML; G/1000ML; G/1000ML
INJECTION, SOLUTION INTRAVENOUS CONTINUOUS
Status: DISCONTINUED | OUTPATIENT
Start: 2019-12-11 | End: 2019-12-13 | Stop reason: HOSPADM

## 2019-12-11 RX ORDER — LIDOCAINE 40 MG/G
CREAM TOPICAL
Status: DISCONTINUED | OUTPATIENT
Start: 2019-12-11 | End: 2019-12-13 | Stop reason: HOSPADM

## 2019-12-11 RX ORDER — DIPHENHYDRAMINE HYDROCHLORIDE 50 MG/ML
50 INJECTION INTRAMUSCULAR; INTRAVENOUS ONCE
Status: COMPLETED | OUTPATIENT
Start: 2019-12-11 | End: 2019-12-11

## 2019-12-11 RX ORDER — LORAZEPAM 2 MG/ML
2 INJECTION INTRAMUSCULAR ONCE
Status: COMPLETED | OUTPATIENT
Start: 2019-12-11 | End: 2019-12-11

## 2019-12-11 RX ORDER — NALOXONE HYDROCHLORIDE 0.4 MG/ML
.1-.4 INJECTION, SOLUTION INTRAMUSCULAR; INTRAVENOUS; SUBCUTANEOUS
Status: DISCONTINUED | OUTPATIENT
Start: 2019-12-11 | End: 2019-12-13 | Stop reason: HOSPADM

## 2019-12-11 RX ORDER — POTASSIUM CHLORIDE 29.8 MG/ML
20 INJECTION INTRAVENOUS
Status: DISCONTINUED | OUTPATIENT
Start: 2019-12-11 | End: 2019-12-13 | Stop reason: HOSPADM

## 2019-12-11 RX ADMIN — POTASSIUM CHLORIDE 40 MEQ: 750 TABLET, EXTENDED RELEASE ORAL at 11:07

## 2019-12-11 RX ADMIN — LORAZEPAM 2 MG: 2 INJECTION INTRAMUSCULAR; INTRAVENOUS at 22:49

## 2019-12-11 RX ADMIN — POTASSIUM CHLORIDE 20 MEQ: 750 TABLET, EXTENDED RELEASE ORAL at 13:16

## 2019-12-11 RX ADMIN — DIPHENHYDRAMINE HYDROCHLORIDE 50 MG: 50 INJECTION, SOLUTION INTRAMUSCULAR; INTRAVENOUS at 21:55

## 2019-12-11 RX ADMIN — POTASSIUM CHLORIDE, DEXTROSE MONOHYDRATE AND SODIUM CHLORIDE: 150; 5; 900 INJECTION, SOLUTION INTRAVENOUS at 21:33

## 2019-12-11 RX ADMIN — SODIUM CHLORIDE 1000 ML: 9 INJECTION, SOLUTION INTRAVENOUS at 01:42

## 2019-12-11 RX ADMIN — POTASSIUM CHLORIDE, DEXTROSE MONOHYDRATE AND SODIUM CHLORIDE: 150; 5; 900 INJECTION, SOLUTION INTRAVENOUS at 08:39

## 2019-12-11 RX ADMIN — ACETAMINOPHEN 975 MG: 325 TABLET, FILM COATED ORAL at 08:39

## 2019-12-11 ASSESSMENT — ACTIVITIES OF DAILY LIVING (ADL)
TRANSFERRING: 0-->INDEPENDENT
SWALLOWING: 0-->SWALLOWS FOODS/LIQUIDS WITHOUT DIFFICULTY
BATHING: 0-->INDEPENDENT
RETIRED_EATING: 0-->INDEPENDENT
RETIRED_COMMUNICATION: 0-->UNDERSTANDS/COMMUNICATES WITHOUT DIFFICULTY
FALL_HISTORY_WITHIN_LAST_SIX_MONTHS: NO
ADLS_ACUITY_SCORE: 10
AMBULATION: 0-->INDEPENDENT
ADLS_ACUITY_SCORE: 10
ADLS_ACUITY_SCORE: 15
COGNITION: 0 - NO COGNITION ISSUES REPORTED
TOILETING: 0-->INDEPENDENT
DRESS: 0-->INDEPENDENT

## 2019-12-11 ASSESSMENT — ENCOUNTER SYMPTOMS
MYALGIAS: 0
SHORTNESS OF BREATH: 0
WEAKNESS: 0
EYE PAIN: 0
HEMATURIA: 0
HEADACHES: 0
DIARRHEA: 0
WHEEZING: 0
DYSURIA: 0
PALPITATIONS: 0
CHILLS: 0
NAUSEA: 1
RHINORRHEA: 0
BLOOD IN STOOL: 0
ARTHRALGIAS: 0
VOMITING: 1
SORE THROAT: 0
ABDOMINAL DISTENTION: 1
JOINT SWELLING: 0
ABDOMINAL PAIN: 1
COUGH: 0
NUMBNESS: 0
NECK PAIN: 0
FEVER: 0
DIAPHORESIS: 0
CONSTIPATION: 0
FATIGUE: 0
NECK STIFFNESS: 0

## 2019-12-11 ASSESSMENT — MIFFLIN-ST. JEOR: SCORE: 1632.06

## 2019-12-11 NOTE — PLAN OF CARE
"BP (!) 162/109   Pulse 64   Temp 97.2  F (36.2  C) (Oral)   Resp 18   Ht 1.803 m (5' 11\")   Wt 68.5 kg (151 lb)   SpO2 98%   BMI 21.06 kg/m     Reason for admission: Intractable abdominal pain   Pain/Nausea: denies both, no emesis since yesterday in ED  Mobility: independent  Diet: CLD  Labs: K+ 3.2  Lines: PIV infusing MIVF @100/hr  Skin/Incisions: no deficits noted  Neuro: A&Ox4  Respiratory: WDL  Cardiac: WDL  GI: BS+, passing gas, LBM 12/10  : WDL  New Changes: potassium replaced, recheck tomorrow AM & 1700 today  Plan: continue with POC  "

## 2019-12-11 NOTE — ED NOTES
Attempted to insert NG tube twice. Meet resistance in left nares. Patient was unable to tolerate the right nares. Patient refused NG insertion. He said he's feeling better and would like to go home. Dr. Avalos was informed.

## 2019-12-11 NOTE — UTILIZATION REVIEW
"Admission Status; Secondary Review Determination     Under the authority of the Utilization Management Committee, the utilization review process indicated a secondary review on the above patient.  The review outcome is based on review of the medical records, discussions with staff, and applying clinical experience noted on the date of the review.       (x) Observation Status Appropriate - This patient does not meet hospital inpatient criteria and is placed in observation status. If this patient's primary payer is Medicare and was admitted as an inpatient, Condition Code 44 should be used and patient status changed to \"observation\".     RATIONALE FOR DETERMINATION: 39-year-old male presented to hospital with several days of abdominal pain, nausea and vomiting.  Initial CT imaging had some concern of potential bowel ischemia.  Observation care appropriate for initial supportive measures and to rule out obstruction/ischemia.  Case reviewed with treating team.     The severity of illness, intensity of service provided, expected LOS and risk for adverse outcome make the care appropriate for further observation; however, doesn't meet criteria for hospital inpatient admission. This was discussed with attending physician who concurred with this determination.    The information on this document is developed by the utilization review team in order for the business office to ensure compliance.  This only denotes the appropriateness of proper admission status and does not reflect the quality of care rendered.         The definitions of Inpatient Status and Observation Status used in making the determination above are those provided in the CMS Coverage Manual, Chapter 1 and Chapter 6, section 70.4.      Sincerely,     Syed Valles MD    Physician Advisor  Utilization Review/ Case Management  [facility].    "
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
## 2019-12-11 NOTE — ED NOTES
[facility]   ED Nurse to Floor Handoff     Kianna Lux is a 39 year old male who speaks English and lives alone,  in a home  They arrived in the ED by ambulance from emergency room    ED Chief Complaint: Abdominal Pain (nausea, vomiting started yesterday pt unsure if he has diarrhea)    ED Dx;   Final diagnoses:   Internal hernia   Small bowel obstruction (H)         Needed?: No    Allergies: No Known Allergies.  Past Medical Hx: History reviewed. No pertinent past medical history.   Baseline Mental status: WDL  Current Mental Status changes: at basesline    Infection present or suspected this encounter: no  Sepsis suspected: No  Isolation type: No active isolations     Activity level - Baseline/Home:  Independent  Activity Level - Current:   Independent    Bariatric equipment needed?: No    In the ED these meds were given:   Medications   ondansetron (ZOFRAN) injection 4 mg (4 mg Intravenous Given 12/10/19 2237)   0.9% sodium chloride BOLUS (0 mLs Intravenous Stopped 12/11/19 0030)     Followed by   sodium chloride 0.9% infusion (1,000 mLs Intravenous New Bag 12/11/19 0142)   morphine (PF) injection 4 mg (4 mg Intravenous Given 12/10/19 2237)   iopamidol (ISOVUE-370) solution 100 mL (75 mLs Intravenous Given 12/10/19 2301)   sodium chloride 0.9% infusion (0 mLs Intravenous Stopped 12/10/19 2343)       Drips running?  No    Home pump  No    Current LDAs  Peripheral IV 12/10/19 Right Upper arm (Active)   Site Assessment [facility] 12/10/2019 10:14 PM   Number of days: 1       Labs results:   Labs Ordered and Resulted from Time of ED Arrival Up to the Time of Departure from the ED   COMPREHENSIVE METABOLIC PANEL - Abnormal; Notable for the following components:       Result Value    Potassium 3.3 (*)     Glucose 103 (*)     All other components within normal limits   CBC WITH PLATELETS DIFFERENTIAL   LACTIC ACID WHOLE BLOOD   LIPASE   UA MACROSCOPIC WITH REFLEX TO MICRO AND  CULTURE   NASOGASTRIC TUBE DECOMPRESSION       Imaging Studies:   Recent Results (from the past 24 hour(s))   CT Abdomen Pelvis w Contrast    Narrative    EXAM: CT ABDOMEN PELVIS W CONTRAST  LOCATION: [facility]  DATE/TIME: 12/10/2019 2:00 PM    INDICATION: Abd pain, acute, generalized; 1 day history of acute onset diffuse abdominal pain radiating toward back. 12 history of emesis 2/2 PO intake;  COMPARISON: None.  TECHNIQUE: CT scan of the abdomen and pelvis was performed following injection of IV contrast. Multiplanar reformats were obtained. Dose reduction techniques were used.  CONTRAST: 75ml's     FINDINGS:   LOWER CHEST: Normal.    HEPATOBILIARY: Normal.    PANCREAS: Normal.    SPLEEN: Normal.    ADRENAL GLANDS: Normal.    KIDNEYS/BLADDER: Normal.    BOWEL: There is marked thickening of the proximal jejunum. There is swirling of the mesentery with severe narrowing of the superior mesenteric vein suggesting an internal hernia. There is moderate distention of the stomach with gas and fluid. Distal   small bowel loops are collapsed. Moderate amount of gas and stool proximal colon. Distal colon unremarkable. Normal appendix.    LYMPH NODES: Normal.    VASCULATURE: Unremarkable.    PELVIC ORGANS: Normal.    OTHER: Trace free fluid in the pelvis.    MUSCULOSKELETAL: 4      Impression    IMPRESSION:   1.  Marked thickening of the proximal jejunum with swirling of the mesentery and narrowing of the superior mesenteric vein, suggesting an internal hernia.  2.  Small volume free fluid in the pelvis.         Recent vital signs:   BP (!) 168/112   Pulse 68   Temp 97.4  F (36.3  C) (Oral)   Resp 18   Wt 68.9 kg (152 lb)   SpO2 98%   BMI 21.20 kg/m      Derrick Coma Scale Score: 15 (12/11/19 0230)       Cardiac Rhythm:   Pt needs tele? No  Skin/wound Issues: None    Code Status:     Pain control: good    Nausea control: pt had none    Abnormal labs/tests/findings requiring intervention:     Family  present during ED course? No   Family Comments/Social Situation comments:     Tasks needing completion: None    Elzbieta Hi, RN  [phone] [facility]
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
## 2019-12-11 NOTE — PROGRESS NOTES
"Surgery Progress Note  December 11, 2019     S: Patient presented to the [facility] ED yesterday evening with abdominal pain, nausea and vomiting. CT scan in the ED demonstrated dilated small bowel. Transferred to the [facility] and surgery team was consulted.      This morning the patient reports that he feels fine and just wants to get some sleep. He denies abdominal pain. Reports that his last bowel movement was 12/10. Not passing flatus this morning.     O: BP (!) 162/109   Pulse 64   Temp 97.2  F (36.2  C) (Oral)   Resp 18   Ht 1.803 m (5' 11\")   Wt 68.5 kg (151 lb)   SpO2 98%   BMI 21.06 kg/m       General: laying in bed, no acute distress  Cardio: RRR  Resp: Non-labored breathing on room air  Abdomen: soft, non-tender to palpation.   Ext: warm, well perfused, no edema    Recent Labs:   WBC: 9.2  Hgb: 15.1    Imaging:     CT Abdomen Pelvis with Contrast (12/10/19)  Impression:   1.  Marked thickening of the proximal jejunum with swirling of the mesentery and narrowing of the superior mesenteric vein, suggesting an internal hernia.  2.  Small volume free fluid in the pelvis.      A: Mr. Lux is a 39 year old male with past medical history significant for exploratory laparotomy at [facility] in 11/2016, now found to have evidence of internal hernia vs closed loop obstruction on CT, however discordant physical exam and lab findings. Patient feels well this morning, will continue to observe.     - NPO, MIVF  - Serial abdominal exams today  - If has another episode of nausea / vomiting, will place NGT   - Low threshold to proceed with operative management, likely would begin as exploratory laparoscopy    Ophelia Calderón MS3    Resident/Fellow Attestation   I, Renay Romero, was present with the medical student who participated in the service and in the documentation of the note.  I have verified the history and personally performed the physical exam and medical decision making.  I agree with the assessment and " plan of care as documented in the note.      Renay Romero MD  PGY1

## 2019-12-11 NOTE — ED NOTES
39-year-old male who presented to the [facility] emergency department with abdominal pain.  CT abdomen pelvis showed signs of an internal hernia near the jejunum.  Surgery was consulted and plans an admission with likely operative repair later today.       Natanael Victor MD  12/11/19 0359

## 2019-12-11 NOTE — PLAN OF CARE
Vital signs:  Temp: 97.2  F (36.2  C) Temp src: Oral BP: (!) 162/109 Pulse: 64   Resp: 18 SpO2: 98 % O2 Device: None (Room air)      [id number]    Pt arrived from ED to  around 0620. A&Ox4. Denies pain and nausea. NPO. R PIV SL. MIVF ordered from pharmacy. Pt refused skin assessment this shift, pt wants to sleep. CT abdomen pelvis showed signs of an internal hernia near the jejunum. Plan for surgery later today. Continue POC.

## 2019-12-12 ENCOUNTER — ANESTHESIA EVENT (OUTPATIENT)
Dept: MEDSURG UNIT | Facility: CLINIC | Age: 39
End: 2019-12-12

## 2019-12-12 ENCOUNTER — ANESTHESIA (OUTPATIENT)
Dept: MEDSURG UNIT | Facility: CLINIC | Age: 39
End: 2019-12-12

## 2019-12-12 PROBLEM — K56.609 SMALL BOWEL OBSTRUCTION (H): Status: ACTIVE | Noted: 2019-12-12

## 2019-12-12 LAB
ANION GAP SERPL CALCULATED.3IONS-SCNC: 3 MMOL/L (ref 3–14)
ANION GAP SERPL CALCULATED.3IONS-SCNC: 4 MMOL/L (ref 3–14)
BUN SERPL-MCNC: 10 MG/DL (ref 7–30)
BUN SERPL-MCNC: 11 MG/DL (ref 7–30)
CALCIUM SERPL-MCNC: 8.6 MG/DL (ref 8.5–10.1)
CALCIUM SERPL-MCNC: 8.6 MG/DL (ref 8.5–10.1)
CHLORIDE SERPL-SCNC: 111 MMOL/L (ref 94–109)
CHLORIDE SERPL-SCNC: 113 MMOL/L (ref 94–109)
CO2 SERPL-SCNC: 26 MMOL/L (ref 20–32)
CO2 SERPL-SCNC: 28 MMOL/L (ref 20–32)
CREAT SERPL-MCNC: 0.96 MG/DL (ref 0.66–1.25)
CREAT SERPL-MCNC: 0.98 MG/DL (ref 0.66–1.25)
ERYTHROCYTE [DISTWIDTH] IN BLOOD BY AUTOMATED COUNT: 12.7 % (ref 10–15)
GFR SERPL CREATININE-BSD FRML MDRD: >90 ML/MIN/{1.73_M2}
GFR SERPL CREATININE-BSD FRML MDRD: >90 ML/MIN/{1.73_M2}
GLUCOSE SERPL-MCNC: 110 MG/DL (ref 70–99)
GLUCOSE SERPL-MCNC: 135 MG/DL (ref 70–99)
HCT VFR BLD AUTO: 44.4 % (ref 40–53)
HGB BLD-MCNC: 14.4 G/DL (ref 13.3–17.7)
LACTATE BLD-SCNC: 0.8 MMOL/L (ref 0.7–2)
MCH RBC QN AUTO: 27.9 PG (ref 26.5–33)
MCHC RBC AUTO-ENTMCNC: 32.4 G/DL (ref 31.5–36.5)
MCV RBC AUTO: 86 FL (ref 78–100)
PLATELET # BLD AUTO: 324 10E9/L (ref 150–450)
POTASSIUM SERPL-SCNC: 3.5 MMOL/L (ref 3.4–5.3)
POTASSIUM SERPL-SCNC: 3.7 MMOL/L (ref 3.4–5.3)
RBC # BLD AUTO: 5.17 10E12/L (ref 4.4–5.9)
SODIUM SERPL-SCNC: 141 MMOL/L (ref 133–144)
SODIUM SERPL-SCNC: 142 MMOL/L (ref 133–144)
WBC # BLD AUTO: 6.8 10E9/L (ref 4–11)

## 2019-12-12 PROCEDURE — 12000001 ZZH R&B MED SURG/OB UMMC

## 2019-12-12 PROCEDURE — 85027 COMPLETE CBC AUTOMATED: CPT | Performed by: STUDENT IN AN ORGANIZED HEALTH CARE EDUCATION/TRAINING PROGRAM

## 2019-12-12 PROCEDURE — 36415 COLL VENOUS BLD VENIPUNCTURE: CPT | Performed by: STUDENT IN AN ORGANIZED HEALTH CARE EDUCATION/TRAINING PROGRAM

## 2019-12-12 PROCEDURE — 80048 BASIC METABOLIC PNL TOTAL CA: CPT | Performed by: STUDENT IN AN ORGANIZED HEALTH CARE EDUCATION/TRAINING PROGRAM

## 2019-12-12 PROCEDURE — 25800025 ZZH RX 258: Performed by: STUDENT IN AN ORGANIZED HEALTH CARE EDUCATION/TRAINING PROGRAM

## 2019-12-12 PROCEDURE — 25000132 ZZH RX MED GY IP 250 OP 250 PS 637: Performed by: STUDENT IN AN ORGANIZED HEALTH CARE EDUCATION/TRAINING PROGRAM

## 2019-12-12 RX ORDER — DIPHENHYDRAMINE HYDROCHLORIDE 50 MG/ML
25 INJECTION INTRAMUSCULAR; INTRAVENOUS EVERY 6 HOURS PRN
Status: DISCONTINUED | OUTPATIENT
Start: 2019-12-12 | End: 2019-12-13 | Stop reason: HOSPADM

## 2019-12-12 RX ORDER — SODIUM CHLORIDE, SODIUM LACTATE, POTASSIUM CHLORIDE, CALCIUM CHLORIDE 600; 310; 30; 20 MG/100ML; MG/100ML; MG/100ML; MG/100ML
INJECTION, SOLUTION INTRAVENOUS CONTINUOUS
Status: DISCONTINUED | OUTPATIENT
Start: 2019-12-12 | End: 2019-12-13 | Stop reason: HOSPADM

## 2019-12-12 RX ADMIN — POTASSIUM CHLORIDE, DEXTROSE MONOHYDRATE AND SODIUM CHLORIDE: 150; 5; 900 INJECTION, SOLUTION INTRAVENOUS at 06:55

## 2019-12-12 RX ADMIN — ACETAMINOPHEN 975 MG: 325 TABLET, FILM COATED ORAL at 17:06

## 2019-12-12 ASSESSMENT — ACTIVITIES OF DAILY LIVING (ADL)
ADLS_ACUITY_SCORE: 14

## 2019-12-12 NOTE — PROGRESS NOTES
"Surgery Progress Note  December 12, 2019     S: Patient developed worsening abdominal pain and vomiting last night, necessitating NG-tube placement. This morning, he is complaining of throat pain due to the NG tube and perseverates on when it can be removed. States he is passing flatus this morning and had a small bowel movement. Voiding spontaneously.     O: BP (!) 166/98 (BP Location: Left arm)   Pulse 89   Temp 96.7  F (35.9  C)   Resp 18   Ht 1.803 m (5' 11\")   Wt 68.5 kg (151 lb)   SpO2 99%   BMI 21.06 kg/m       General: laying in bed, no acute distress  Cardio: RRR  Resp: Non-labored breathing on room air  Abdomen: soft, mildly tender to palpation, no peritonitis.  Ext: warm, well perfused, no edema    Recent Labs:   Recent Labs   Lab 12/12/19  0648 12/11/19  2344 12/11/19  0440 12/10/19  2203   WBC 6.8 7.5 9.2 10.9   HGB 14.4 14.7 15.1 16.7   MCV 86 85 86 85    317 351 361    142 140 139   POTASSIUM 3.7 3.5 3.2* 3.3*   CHLORIDE 113* 111* 105 101   CO2 26 28 29 32   BUN 10 11 14 15   CR 0.98 0.96 1.01 1.07   ANIONGAP 3 4 7 6   DAY 8.6 8.6 8.8 9.4   * 135* 94 103*   ALBUMIN  --   --   --  4.3   PROTTOTAL  --   --   --  8.6   BILITOTAL  --   --   --  0.8   ALKPHOS  --   --   --  66   ALT  --   --   --  22   AST  --   --   --  16   LIPASE  --   --   --  194       Imaging:   CT Abdomen Pelvis with Contrast (12/10/19)  \"Marked thickening of the proximal jejunum with swirling of the mesentery and narrowing of the superior mesenteric vein, suggesting an internal hernia. Small volume free fluid in the pelvis.\"    A: Mr. Lux is a 39 year old male with past medical history significant for exploratory laparotomy at [facility] in 11/2016, now found to have evidence of internal hernia vs closed loop obstruction on CT, however discordant physical exam and lab findings, with intermittent symptoms. He did develop worsening abdominal pain and vomiting 12/11 PM and NGT was placed (removed by patient " 12/12 AM).     - NPO, MIVF  - Recommend exploratory laparoscopy given worsening symptoms overnight, patient currently declining and requesting we speak with his brother whom he states is en route. Patient declined to provide me with his brother's phone number. Will await patient's brother arrival.  - Recommend continuing NGT decompression in the interim, however, patient self-discontinued      Ophelia Calderón MS3    Resident/Fellow Attestation   I, Nimisha Hilliard, was present with the medical student who participated in the service and in the documentation of the note.  I have verified the history and personally performed the physical exam and medical decision making.  I agree with the assessment and plan of care as documented in the note.      Nimisha Hilliard MD  PGY1  General Surgery Service

## 2019-12-12 NOTE — PLAN OF CARE
"Shift: 2300 - 0700  VS: BP (!) 163/101   Pulse 89   Temp 97  F (36.1  C) (Axillary)   Resp 20   Ht 1.803 m (5' 11\")   Wt 68.5 kg (151 lb)   SpO2 98%   BMI 21.06 kg/m      Neuro: pt received IV ativan and benadryl following NG placement, arouses to voice/light touch, lethargic; CMS intact  Cardiac: hypertensive, within parameters per orders, provider notified and instructed to monitor  Resp: lung sounds clear, no SOB reported, sating well on RA  GI: passing gas, bowel sounds active, LBM 12/10  : voiding spontaneously  Skin: appropriate for ethnicity  Pain/Nausea: denies pain; intermittent nausea improved with NG to suction   LDA: R PIV infusing IVMF, NG to LIS  Diet: strict NPO  Mobility: SBA, help with lines  Labs: reviewed, 0000 lactic 0.8, K recheck 3.5  Plan: continue plan of care     "

## 2019-12-12 NOTE — PROGRESS NOTES
12/12/2019  1:41 PM     39 year old male with evidence of internal hernia vs closed loop obstruction on CT.    Continue to recommend surgical intervention as he may have ischemic gut. Patient and his brother refusing surgery.    Discussed with chief Dr. Whelan and Dr. Garcia, both of whom have also spoken with patient multiple times.    Nimisha Hilliard MD   General Surgery Service PGY1  [id number]

## 2019-12-12 NOTE — PROVIDER NOTIFICATION
Overnight provider notified that pt BP was 169/111, HR 90-100s. Provider instructed to monitor overnight and notify if greater than ordered parameters (180/120). OVSS, pt resting comfortably.

## 2019-12-12 NOTE — PLAN OF CARE
Vitals:    12/12/19 0028 12/12/19 0100 12/12/19 0330 12/12/19 0901   BP: (!) 169/111 (!) 163/101 (!) 161/97 (!) 166/98   BP Location:   Left arm Left arm   Pulse: 100 89 94 89   Resp: 16 20 18 18   Temp:   97.1  F (36.2  C) 96.7  F (35.9  C)   TempSrc:   Axillary    SpO2: 96% 98% 99% 99%   Weight:       Height:           Neuro: alert and oriented     VS: afebrile hypertensive, sating 99% on room air, non labor breathing        Cardiac: HR 89 this AM     Pain/Nausea: denies pain or nausea,    Lines/Drains: PIV, Pt refused NG, MD notified, discontinue order written,                         NG discontinued,     Incision/Wound: none    GI/: voiding not saved,  small loose stool per Pt ,     Diet/Appetite: remain NPO,     Activity: up and ambulated independently, out to smoke,     Plan:  for procedure, continue with plan of care.

## 2019-12-12 NOTE — PLAN OF CARE
NEURO: A&O x4, calls appropriately. Ativan given after NG tube placement d/t restlessness   RESPIRATORY: LS clear and equal bilaterally. Denies SOB   CARDIAC: WDL, denies chest pain.  GI/: Abdominal pain became worse around 2100, c/o constipation, and vomiting-MD paged and NG tube placed. +BS, passing gas.   DIET: NPO   PAIN/NAUSEA: C/o worsening abdominal pain and nausea with emesis x2, equaling over 1100 mL/hr   IV ACCESS: R PIV infusing D5/NS/KCl @ 100 mL/hr   ACTIVITY: Independent   LAB: K+ 3.2 this AM, replaced on day shift. Evening labs pending  PLAN: Continue with POC

## 2019-12-13 VITALS
HEART RATE: 88 BPM | SYSTOLIC BLOOD PRESSURE: 154 MMHG | DIASTOLIC BLOOD PRESSURE: 95 MMHG | BODY MASS INDEX: 21.14 KG/M2 | HEIGHT: 71 IN | TEMPERATURE: 98.4 F | RESPIRATION RATE: 16 BRPM | WEIGHT: 151 LBS | OXYGEN SATURATION: 100 %

## 2019-12-13 PROCEDURE — 25000132 ZZH RX MED GY IP 250 OP 250 PS 637: Performed by: STUDENT IN AN ORGANIZED HEALTH CARE EDUCATION/TRAINING PROGRAM

## 2019-12-13 RX ORDER — SENNOSIDES A AND B 8.6 MG/1
1 TABLET, FILM COATED ORAL 2 TIMES DAILY PRN
Qty: 60 TABLET | Refills: 0 | Status: SHIPPED | OUTPATIENT
Start: 2019-12-13

## 2019-12-13 RX ADMIN — ACETAMINOPHEN 975 MG: 325 TABLET, FILM COATED ORAL at 17:04

## 2019-12-13 ASSESSMENT — ACTIVITIES OF DAILY LIVING (ADL)
ADLS_ACUITY_SCORE: 10
ADLS_ACUITY_SCORE: 14
ADLS_ACUITY_SCORE: 10

## 2019-12-13 NOTE — PLAN OF CARE
Vitals:    12/12/19 1622 12/12/19 1651 12/12/19 2334 12/13/19 0815   BP: 117/72 (!) 165/99 (!) 174/100 (!) 174/98   BP Location: Left arm Left arm Left arm Left arm   Pulse: 84 81 70 76   Resp: 19 18 18 16   Temp: 97.3  F (36.3  C) 98.4  F (36.9  C) 97.5  F (36.4  C) 98.4  F (36.9  C)   TempSrc: Oral Oral Oral Oral   SpO2: 93% 100% 100% 100%   Weight:       Height:           Neuro: intact     VS: afebrile, hypertensive sating 100% on room air     Pain/Nausea: denies any pain or nausea.    Lines/Drains: PIV pt complain of IV site hurting and pulled out,    Incision/Wound: none    GI/: voiding adequate not saved, had medium soft BM    Diet/Appetite: started with clear and currently taking regular intake     Activity: up and ambulated independent, continue with plan of care.    Plan:
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
## 2019-12-13 NOTE — PROGRESS NOTES
"Surgery Progress Note  December 13, 2019     S:   No acute events overnight. He pulled his NG tube against medical advice yesterday and states he has not had increased abdominal pain or nausea. He is passing flatus, reports one small bowel movement yesterday. He has remained NPO.    Extensive discussion with patient and his brother throughout the day yesterday regarding our recommendation to proceed to OR for exploratory laparoscopy given his concerning imaging, which they have refused. Patient also refusing labs this morning.    O: BP (!) 174/100 (BP Location: Left arm)   Pulse 70   Temp 97.5  F (36.4  C) (Oral)   Resp 18   Ht 1.803 m (5' 11\")   Wt 68.5 kg (151 lb)   SpO2 100%   BMI 21.06 kg/m       General: laying in bed, no acute distress  Cardio: RRR  Resp: Non-labored breathing on room air  Abdomen: soft, mildly tender to palpation, no peritonitis.  Ext: warm, well perfused, no edema    Recent Labs:   Recent Labs   Lab 12/12/19  0648 12/11/19  2344 12/11/19  0440 12/10/19  2203   WBC 6.8 7.5 9.2 10.9   HGB 14.4 14.7 15.1 16.7   MCV 86 85 86 85    317 351 361    142 140 139   POTASSIUM 3.7 3.5 3.2* 3.3*   CHLORIDE 113* 111* 105 101   CO2 26 28 29 32   BUN 10 11 14 15   CR 0.98 0.96 1.01 1.07   ANIONGAP 3 4 7 6   DAY 8.6 8.6 8.8 9.4   * 135* 94 103*   ALBUMIN  --   --   --  4.3   PROTTOTAL  --   --   --  8.6   BILITOTAL  --   --   --  0.8   ALKPHOS  --   --   --  66   ALT  --   --   --  22   AST  --   --   --  16   LIPASE  --   --   --  194     Patient refused labs 12/13 AM.    Imaging:   CT Abdomen Pelvis with Contrast (12/10/19)  \"Marked thickening of the proximal jejunum with swirling of the mesentery and narrowing of the superior mesenteric vein, suggesting an internal hernia. Small volume free fluid in the pelvis.\"    A: Mr. Lux is a 39 year old male with past medical history significant for exploratory laparotomy at [facility] in 11/2016, now found to have evidence of internal hernia " vs closed loop obstruction on CT, however discordant physical exam and lab findings, with intermittent symptoms. He did develop worsening abdominal pain and vomiting 12/11 PM and NGT was placed (removed by patient 12/12 AM).     - NPO, MIVF  - Recommend exploratory laparoscopy given concerning imaging findings, patient currently declining and again requesting we speak with his brother whom he states is en route. Will await brother's arrival and discuss our continued recommendation for surgery at this time.    Ophelia Calderón MS3    Resident/Fellow Attestation   I, Nimisha Hilliard, was present with the medical student who participated in the service and in the documentation of the note.  I have verified the history and personally performed the physical exam and medical decision making.  I agree with the assessment and plan of care as documented in the note.      Patient seen with chief Dr. Whelan, will be discussed with Dr. Garcia.     Nimisha Hilliard MD  PGY1  General Surgery Service

## 2019-12-13 NOTE — PLAN OF CARE
"Vital signs:  BP (!) 165/99 (BP Location: Left arm)   Pulse 81   Temp 98.4  F (36.9  C) (Oral)   Resp 18   Ht 1.803 m (5' 11\")   Wt 68.5 kg (151 lb)   SpO2 100%   BMI 21.06 kg/m           Activity: independent   Neuros: A&Ox4  Cardiac:WDL, denies chest pain  Respiratory: Ls:clear bilaterally, denies SOB, room air  GI/: +BS, per pt report last BM 12/12-loose per pt report, voiding adequately  Diet: strict NPO  Skin: intact, PIV  Lines:PIV infusing D5+NS+20 KCl @ 100 ml/hr   Labs: K 3.7 , WBC 6.8, Hgb 14.4    Pain/nausea: denies   Plan: Cont POC, possible procedure    Pt and brother (jarod) don't understand the need for procedure if symptoms of pain, nausea, & vomiting are getting better.   "

## 2019-12-13 NOTE — PLAN OF CARE
"BP (!) 174/100 (BP Location: Left arm)   Pulse 70   Temp 97.5  F (36.4  C) (Oral)   Resp 18   Ht 1.803 m (5' 11\")   Wt 68.5 kg (151 lb)   SpO2 100%   BMI 21.06 kg/m      Activity: Pt up independent to bathroom   Neuros: A&Ox4, calls appropriately. CMS intact. Pt denies numbness/tingling   Cardiac:WDL, denies chest pain  Respiratory: Ls:clear bilaterally, denies SOB, room air  GI/: +BS, per pt report last BM 12/12-loose per pt report, voiding adequately in bathroom   Diet: strict NPO  Skin: intact, PIV infusing fluids @ 100 mL/hr   Lines:PIV infusing D5+NS+20 KCl @ 100 ml/hr   Labs: Reviewed  Pain/nausea: denies pain/nausea    Plan: Cont POC, possible procedure  "

## 2019-12-14 NOTE — PLAN OF CARE
Pt tolerating regular intake, discharge script written, teaching has been done.  Discharge med sent to discharge pharmacy, pt discharge home with family member.

## 2019-12-16 ENCOUNTER — PATIENT OUTREACH (OUTPATIENT)
Dept: SURGERY | Facility: CLINIC | Age: 39
End: 2019-12-16

## 2022-08-21 ENCOUNTER — HOSPITAL ENCOUNTER (EMERGENCY)
Facility: CLINIC | Age: 42
Discharge: HOME | End: 2022-08-21
Attending: EMERGENCY MEDICINE | Admitting: EMERGENCY MEDICINE
Payer: COMMERCIAL

## 2022-08-21 ENCOUNTER — APPOINTMENT (OUTPATIENT)
Dept: GENERAL RADIOLOGY | Facility: CLINIC | Age: 42
End: 2022-08-21
Attending: EMERGENCY MEDICINE
Payer: COMMERCIAL

## 2022-08-21 VITALS
OXYGEN SATURATION: 100 % | BODY MASS INDEX: 21.47 KG/M2 | DIASTOLIC BLOOD PRESSURE: 92 MMHG | HEIGHT: 70 IN | SYSTOLIC BLOOD PRESSURE: 157 MMHG | HEART RATE: 125 BPM | TEMPERATURE: 100.7 F | WEIGHT: 150 LBS | RESPIRATION RATE: 19 BRPM

## 2022-08-21 DIAGNOSIS — U07.1 INFECTION DUE TO 2019 NOVEL CORONAVIRUS: ICD-10-CM

## 2022-08-21 DIAGNOSIS — E86.0 DEHYDRATION: ICD-10-CM

## 2022-08-21 LAB
ALBUMIN SERPL-MCNC: 3.8 G/DL (ref 3.4–5)
ALBUMIN UR-MCNC: NEGATIVE MG/DL
ALP SERPL-CCNC: 55 U/L (ref 40–150)
ALT SERPL W P-5'-P-CCNC: 23 U/L (ref 0–70)
ANION GAP SERPL CALCULATED.3IONS-SCNC: 2 MMOL/L (ref 3–14)
APPEARANCE UR: CLEAR
AST SERPL W P-5'-P-CCNC: 22 U/L (ref 0–45)
ATRIAL RATE - MUSE: 128 BPM
BASOPHILS # BLD AUTO: 0 10E3/UL (ref 0–0.2)
BASOPHILS NFR BLD AUTO: 0 %
BILIRUB SERPL-MCNC: 0.5 MG/DL (ref 0.2–1.3)
BILIRUB UR QL STRIP: NEGATIVE
BUN SERPL-MCNC: 17 MG/DL (ref 7–30)
CALCIUM SERPL-MCNC: 8.9 MG/DL (ref 8.5–10.1)
CHLORIDE BLD-SCNC: 110 MMOL/L (ref 94–109)
CO2 SERPL-SCNC: 28 MMOL/L (ref 20–32)
COLOR UR AUTO: ABNORMAL
CREAT SERPL-MCNC: 1.56 MG/DL (ref 0.66–1.25)
DIASTOLIC BLOOD PRESSURE - MUSE: NORMAL MMHG
EOSINOPHIL # BLD AUTO: 0.1 10E3/UL (ref 0–0.7)
EOSINOPHIL NFR BLD AUTO: 1 %
ERYTHROCYTE [DISTWIDTH] IN BLOOD BY AUTOMATED COUNT: 12.9 % (ref 10–15)
FLUAV RNA SPEC QL NAA+PROBE: NEGATIVE
FLUBV RNA RESP QL NAA+PROBE: NEGATIVE
GFR SERPL CREATININE-BSD FRML MDRD: 57 ML/MIN/1.73M2
GLUCOSE BLD-MCNC: 94 MG/DL (ref 70–99)
GLUCOSE UR STRIP-MCNC: NEGATIVE MG/DL
HCT VFR BLD AUTO: 39.7 % (ref 40–53)
HGB BLD-MCNC: 13.4 G/DL (ref 13.3–17.7)
HGB UR QL STRIP: ABNORMAL
IMM GRANULOCYTES # BLD: 0.1 10E3/UL
IMM GRANULOCYTES NFR BLD: 1 %
INTERPRETATION ECG - MUSE: NORMAL
KETONES UR STRIP-MCNC: NEGATIVE MG/DL
LACTATE SERPL-SCNC: 1.2 MMOL/L (ref 0.7–2)
LEUKOCYTE ESTERASE UR QL STRIP: NEGATIVE
LIPASE SERPL-CCNC: 170 U/L (ref 73–393)
LYMPHOCYTES # BLD AUTO: 0.3 10E3/UL (ref 0.8–5.3)
LYMPHOCYTES NFR BLD AUTO: 3 %
MCH RBC QN AUTO: 28.2 PG (ref 26.5–33)
MCHC RBC AUTO-ENTMCNC: 33.8 G/DL (ref 31.5–36.5)
MCV RBC AUTO: 83 FL (ref 78–100)
MONOCYTES # BLD AUTO: 0.4 10E3/UL (ref 0–1.3)
MONOCYTES NFR BLD AUTO: 4 %
NEUTROPHILS # BLD AUTO: 9.1 10E3/UL (ref 1.6–8.3)
NEUTROPHILS NFR BLD AUTO: 91 %
NITRATE UR QL: NEGATIVE
NRBC # BLD AUTO: 0 10E3/UL
NRBC BLD AUTO-RTO: 0 /100
P AXIS - MUSE: 56 DEGREES
PH UR STRIP: 6 [PH] (ref 5–7)
PLATELET # BLD AUTO: 236 10E3/UL (ref 150–450)
POTASSIUM BLD-SCNC: 3.4 MMOL/L (ref 3.4–5.3)
PR INTERVAL - MUSE: 140 MS
PROT SERPL-MCNC: 7.1 G/DL (ref 6.8–8.8)
QRS DURATION - MUSE: 96 MS
QT - MUSE: 328 MS
QTC - MUSE: 478 MS
R AXIS - MUSE: 16 DEGREES
RBC # BLD AUTO: 4.76 10E6/UL (ref 4.4–5.9)
RBC URINE: 1 /HPF
RSV RNA SPEC NAA+PROBE: NEGATIVE
SARS-COV-2 RNA RESP QL NAA+PROBE: POSITIVE
SODIUM SERPL-SCNC: 140 MMOL/L (ref 133–144)
SP GR UR STRIP: 1.01 (ref 1–1.03)
SYSTOLIC BLOOD PRESSURE - MUSE: NORMAL MMHG
T AXIS - MUSE: 41 DEGREES
TROPONIN I SERPL HS-MCNC: 16 NG/L
UROBILINOGEN UR STRIP-MCNC: NORMAL MG/DL
VENTRICULAR RATE- MUSE: 128 BPM
WBC # BLD AUTO: 10 10E3/UL (ref 4–11)
WBC URINE: <1 /HPF

## 2022-08-21 PROCEDURE — 99285 EMERGENCY DEPT VISIT HI MDM: CPT | Mod: CS,25 | Performed by: EMERGENCY MEDICINE

## 2022-08-21 PROCEDURE — 96361 HYDRATE IV INFUSION ADD-ON: CPT | Performed by: EMERGENCY MEDICINE

## 2022-08-21 PROCEDURE — 93010 ELECTROCARDIOGRAM REPORT: CPT | Performed by: EMERGENCY MEDICINE

## 2022-08-21 PROCEDURE — C9803 HOPD COVID-19 SPEC COLLECT: HCPCS | Performed by: EMERGENCY MEDICINE

## 2022-08-21 PROCEDURE — 93005 ELECTROCARDIOGRAM TRACING: CPT | Performed by: EMERGENCY MEDICINE

## 2022-08-21 PROCEDURE — 87637 SARSCOV2&INF A&B&RSV AMP PRB: CPT | Performed by: EMERGENCY MEDICINE

## 2022-08-21 PROCEDURE — 80053 COMPREHEN METABOLIC PANEL: CPT | Performed by: EMERGENCY MEDICINE

## 2022-08-21 PROCEDURE — 99285 EMERGENCY DEPT VISIT HI MDM: CPT | Mod: CS | Performed by: EMERGENCY MEDICINE

## 2022-08-21 PROCEDURE — 71045 X-RAY EXAM CHEST 1 VIEW: CPT

## 2022-08-21 PROCEDURE — 258N000003 HC RX IP 258 OP 636: Performed by: EMERGENCY MEDICINE

## 2022-08-21 PROCEDURE — 84484 ASSAY OF TROPONIN QUANT: CPT | Performed by: EMERGENCY MEDICINE

## 2022-08-21 PROCEDURE — 36415 COLL VENOUS BLD VENIPUNCTURE: CPT | Performed by: EMERGENCY MEDICINE

## 2022-08-21 PROCEDURE — 81001 URINALYSIS AUTO W/SCOPE: CPT | Performed by: EMERGENCY MEDICINE

## 2022-08-21 PROCEDURE — 82040 ASSAY OF SERUM ALBUMIN: CPT | Performed by: EMERGENCY MEDICINE

## 2022-08-21 PROCEDURE — 83605 ASSAY OF LACTIC ACID: CPT | Performed by: EMERGENCY MEDICINE

## 2022-08-21 PROCEDURE — 250N000013 HC RX MED GY IP 250 OP 250 PS 637: Performed by: EMERGENCY MEDICINE

## 2022-08-21 PROCEDURE — 83690 ASSAY OF LIPASE: CPT | Performed by: EMERGENCY MEDICINE

## 2022-08-21 PROCEDURE — 85025 COMPLETE CBC W/AUTO DIFF WBC: CPT | Performed by: EMERGENCY MEDICINE

## 2022-08-21 PROCEDURE — 96360 HYDRATION IV INFUSION INIT: CPT | Performed by: EMERGENCY MEDICINE

## 2022-08-21 PROCEDURE — 87637 SARSCOV2&INF A&B&RSV AMP PRB: CPT | Mod: 59 | Performed by: EMERGENCY MEDICINE

## 2022-08-21 RX ORDER — IBUPROFEN 600 MG/1
600 TABLET, FILM COATED ORAL ONCE
Status: COMPLETED | OUTPATIENT
Start: 2022-08-21 | End: 2022-08-21

## 2022-08-21 RX ORDER — ACETAMINOPHEN 325 MG/1
975 TABLET ORAL ONCE
Status: COMPLETED | OUTPATIENT
Start: 2022-08-21 | End: 2022-08-21

## 2022-08-21 RX ORDER — SODIUM CHLORIDE 9 MG/ML
INJECTION, SOLUTION INTRAVENOUS CONTINUOUS
Status: DISCONTINUED | OUTPATIENT
Start: 2022-08-21 | End: 2022-08-21 | Stop reason: HOSPADM

## 2022-08-21 RX ADMIN — ACETAMINOPHEN 975 MG: 325 TABLET ORAL at 01:53

## 2022-08-21 RX ADMIN — SODIUM CHLORIDE, POTASSIUM CHLORIDE, SODIUM LACTATE AND CALCIUM CHLORIDE 1000 ML: 600; 310; 30; 20 INJECTION, SOLUTION INTRAVENOUS at 02:54

## 2022-08-21 RX ADMIN — SODIUM CHLORIDE 1000 ML: 9 INJECTION, SOLUTION INTRAVENOUS at 01:53

## 2022-08-21 RX ADMIN — IBUPROFEN 600 MG: 600 TABLET ORAL at 01:53

## 2022-08-21 ASSESSMENT — ENCOUNTER SYMPTOMS
CHILLS: 1
ACTIVITY CHANGE: 1
MYALGIAS: 1
FATIGUE: 1
COUGH: 1
APPETITE CHANGE: 1
BACK PAIN: 1
CARDIOVASCULAR NEGATIVE: 1
GASTROINTESTINAL NEGATIVE: 1
STRIDOR: 1
ARTHRALGIAS: 1
FEVER: 1

## 2022-08-21 ASSESSMENT — ACTIVITIES OF DAILY LIVING (ADL)
ADLS_ACUITY_SCORE: 33
ADLS_ACUITY_SCORE: 35
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
## 2022-08-21 NOTE — DISCHARGE INSTRUCTIONS
Your symptoms are from Covid-19.  The test returned positive  Avoid spread to others  Rest, isolate yourself, drink plenty of fluids.  Use Tylenol and ibuprofen for pain and fevers.  Return if you develop difficulty breathing  Make a follow-up appointment with your primary care provider

## 2022-08-21 NOTE — ED TRIAGE NOTES
Pt. states  yesterday started with fever and shortness of breath.  Also c/o generalized abd.  pain and nausea.  Has dry cough.     Triage Assessment     Row Name 08/21/22 0052       Triage Assessment (Adult)    Airway WDL WDL       Respiratory WDL    Respiratory WDL WDL       Skin Circulation/Temperature WDL    Skin Circulation/Temperature WDL WDL       Cardiac WDL    Cardiac WDL WDL       Peripheral/Neurovascular WDL    Peripheral Neurovascular WDL WDL       Cognitive/Neuro/Behavioral WDL    Cognitive/Neuro/Behavioral WDL WDL